# Patient Record
Sex: MALE | Race: WHITE
[De-identification: names, ages, dates, MRNs, and addresses within clinical notes are randomized per-mention and may not be internally consistent; named-entity substitution may affect disease eponyms.]

---

## 2017-03-12 ENCOUNTER — TRANSCRIPTION ENCOUNTER (OUTPATIENT)
Age: 42
End: 2017-03-12

## 2019-03-29 ENCOUNTER — APPOINTMENT (OUTPATIENT)
Dept: SURGERY | Facility: CLINIC | Age: 44
End: 2019-03-29
Payer: COMMERCIAL

## 2019-03-29 VITALS
OXYGEN SATURATION: 98 % | WEIGHT: 175 LBS | HEIGHT: 68 IN | BODY MASS INDEX: 26.52 KG/M2 | RESPIRATION RATE: 15 BRPM | HEART RATE: 91 BPM | SYSTOLIC BLOOD PRESSURE: 142 MMHG | DIASTOLIC BLOOD PRESSURE: 96 MMHG | TEMPERATURE: 97.9 F

## 2019-03-29 DIAGNOSIS — R10.32 LEFT LOWER QUADRANT PAIN: ICD-10-CM

## 2019-03-29 DIAGNOSIS — Z86.79 PERSONAL HISTORY OF OTHER DISEASES OF THE CIRCULATORY SYSTEM: ICD-10-CM

## 2019-03-29 DIAGNOSIS — Z78.9 OTHER SPECIFIED HEALTH STATUS: ICD-10-CM

## 2019-03-29 DIAGNOSIS — Z83.3 FAMILY HISTORY OF DIABETES MELLITUS: ICD-10-CM

## 2019-03-29 DIAGNOSIS — Z82.49 FAMILY HISTORY OF ISCHEMIC HEART DISEASE AND OTHER DISEASES OF THE CIRCULATORY SYSTEM: ICD-10-CM

## 2019-03-29 PROCEDURE — 99203 OFFICE O/P NEW LOW 30 MIN: CPT

## 2019-03-29 NOTE — HISTORY OF PRESENT ILLNESS
[de-identified] : Dane is a 44 y/o male here for evaluation of left groin pain.  [de-identified] : Over the past 2 months patient has been experiencing intermittent, mild left groin discomfort with heavy lifting. No bulge noted and no abdominal symptoms or change in bowel habits. Pain occasionally radiates to the left testicle. Past history significant for previous inguinal hernia repair during childhood although patient does not recall which side was repaired.

## 2019-03-29 NOTE — PHYSICAL EXAM
[Normal Thyroid] : the thyroid was normal [Respiratory Effort] : normal respiratory effort [Normal Rate and Rhythm] : normal rate and rhythm [Abdominal Aorta] : Normal abdominal aorta [Oriented to Person] : oriented to person [Oriented to Place] : oriented to place [Oriented to Time] : oriented to time [Calm] : calm [de-identified] : In no distress [de-identified] : Normocephalic, atraumatic [de-identified] : No palpable adenopathy [de-identified] : Soft, nondistended, nontender. No palpable mass or organomegaly. No surgical scar apparent in either groin. No hernia palpable in either groin. [de-identified] : External genitalia normal [de-identified] : Normal gait; no deformities [de-identified] : No gross sensory or motor deficit [de-identified] : Normal mood and affect

## 2019-03-29 NOTE — ASSESSMENT
[FreeTextEntry1] : 43-year-old male with intermittent, mild groin discomfort but negative physical exam. Symptoms seem likely related to muscle strain.

## 2019-03-29 NOTE — PLAN
[FreeTextEntry1] : Patient advised to carry on activities ad thad. and continue to observe. Patient to return for reevaluation if symptoms progress or a bulge becomes apparent.

## 2019-12-21 ENCOUNTER — TRANSCRIPTION ENCOUNTER (OUTPATIENT)
Age: 44
End: 2019-12-21

## 2020-10-18 ENCOUNTER — TRANSCRIPTION ENCOUNTER (OUTPATIENT)
Age: 45
End: 2020-10-18

## 2021-04-01 ENCOUNTER — TRANSCRIPTION ENCOUNTER (OUTPATIENT)
Age: 46
End: 2021-04-01

## 2021-07-30 ENCOUNTER — TRANSCRIPTION ENCOUNTER (OUTPATIENT)
Age: 46
End: 2021-07-30

## 2021-10-11 ENCOUNTER — TRANSCRIPTION ENCOUNTER (OUTPATIENT)
Age: 46
End: 2021-10-11

## 2022-03-11 ENCOUNTER — APPOINTMENT (OUTPATIENT)
Dept: OTOLARYNGOLOGY | Facility: CLINIC | Age: 47
End: 2022-03-11
Payer: COMMERCIAL

## 2022-03-11 ENCOUNTER — NON-APPOINTMENT (OUTPATIENT)
Age: 47
End: 2022-03-11

## 2022-03-11 VITALS
BODY MASS INDEX: 26.52 KG/M2 | HEART RATE: 84 BPM | SYSTOLIC BLOOD PRESSURE: 129 MMHG | HEIGHT: 68 IN | DIASTOLIC BLOOD PRESSURE: 89 MMHG | TEMPERATURE: 97.5 F | WEIGHT: 175 LBS

## 2022-03-11 PROCEDURE — 31575 DIAGNOSTIC LARYNGOSCOPY: CPT

## 2022-03-11 PROCEDURE — 99214 OFFICE O/P EST MOD 30 MIN: CPT | Mod: 25

## 2022-03-11 NOTE — CONSULT LETTER
[Consult Letter:] : I had the pleasure of evaluating your patient, [unfilled]. [Please see my note below.] : Please see my note below. [Consult Closing:] : Thank you very much for allowing me to participate in the care of this patient.  If you have any questions, please do not hesitate to contact me. [Sincerely,] : Sincerely, [Dear  ___] : Dear  [unfilled], [FreeTextEntry3] : Kylah Batres MD\par Otolaryngology and Cranial Base Surgery\par Attending Physician - Department of Otolaryngology and Head & Neck Surgery\par [facility]\par  - [employer] [facility]\par Office: [phone]\par Fax: [phone]\par

## 2022-03-11 NOTE — PROCEDURE
[de-identified] : Flexible scope #29 used. Passed through nasal passage and nasopharynx/oropharynx/hypopharynx clear. Supraglottis normal. Glottis with fully mobile vocal cords without lesions or masses. Visualized subglottis clear. Postcricoid area with small white ulcer/exudate to right arytenoid.  mild erythema and edema. no clearing of the white despite multiple repeated swallows. No pooling of secretions. PHOTOS taken\par \par

## 2022-03-11 NOTE — HISTORY OF PRESENT ILLNESS
[de-identified] : 47 y/o M with one month of intermittent sore throat, more on right.  Worse in the morning.  No throat clearing or change in voice.  No trouble eating or drinking.  No f/c/s.  Pos GERD, uses Tums on occasion.  \par Occasional Cigars.  No cigarettes.

## 2022-03-11 NOTE — ASSESSMENT
[FreeTextEntry1] : Small right Arytenoid ulceration, Likely due to LPRD:\par - Reflux precautions, handout given\par - Start Omeprazole in AM\par - Start Famotidine PM\par - F/U one month to ensure resolved. \par \par \par I personally saw and examined Mr. ALICIA OREILLY in detail this visit today. I personally reviewed the HPI, PMH, FH, SH, ROS and medications/allergies. I have spoken to JULIET Hernandez regarding the history and have personally determined the assessment and plan of care, and documented this myself. I was present and participated in all key portions of the encounter and all procedures noted above. I have made changes in the body of the note where appropriate.\par \par Attesting Faculty: See Attending Signature Below

## 2022-05-04 ENCOUNTER — APPOINTMENT (OUTPATIENT)
Dept: OTOLARYNGOLOGY | Facility: CLINIC | Age: 47
End: 2022-05-04
Payer: COMMERCIAL

## 2022-05-04 ENCOUNTER — TRANSCRIPTION ENCOUNTER (OUTPATIENT)
Age: 47
End: 2022-05-04

## 2022-05-04 VITALS — WEIGHT: 171 LBS | BODY MASS INDEX: 25.91 KG/M2 | HEIGHT: 68 IN

## 2022-05-04 DIAGNOSIS — K21.9 GASTRO-ESOPHAGEAL REFLUX DISEASE W/OUT ESOPHAGITIS: ICD-10-CM

## 2022-05-04 DIAGNOSIS — R07.0 PAIN IN THROAT: ICD-10-CM

## 2022-05-04 PROCEDURE — 31575 DIAGNOSTIC LARYNGOSCOPY: CPT

## 2022-05-04 PROCEDURE — 99214 OFFICE O/P EST MOD 30 MIN: CPT | Mod: 25

## 2022-05-04 NOTE — PROCEDURE
[de-identified] : Flexible scope #2 used. Passed through nasal passage and nasopharynx/oropharynx/hypopharynx clear. Supraglottis normal. Glottis with fully mobile vocal cords without lesions or masses. Resolved arytenoid ulceration but still with erythema. Visualized subglottis clear. Postcricoid area with erythema and edema. No pooling of secretions.

## 2022-05-04 NOTE — HISTORY OF PRESENT ILLNESS
[de-identified] : 47yo male seen for throat discomfort. He was noted to have LPRD and a right arytenoid ulceration. Started on 40mg omeprazole in AM and 20mg famotidine at night. He has also modified his diet and lost weight. He feels much better. No throat discomfort. However he does note he is currently trying to pass a kidney stone. He is using ibuprofen 600mg throughout the day to try and help the pain however overnight he had severe pain that was not well controlled. His urologist recommended go to ED for uncontrolled pain.

## 2022-05-04 NOTE — ASSESSMENT
[FreeTextEntry1] : arytenoid ulceration/LPRD, resolved with PPI/H2 blocker and lifestyle modification:\par - recommend cont current regimen while he is taking NSAIDs for his kidney stone\par - also sent in oxycodone 5mg 20 tablets to help him with severe pain so he can reduce his use of the ibuprofen (iSTOP checked and no narcotic prescriptions in the past 12 months)\par - once he is off the NSAIDs advised he can taper off the omeprazole and switch to fomatodine 20mg BID x 2 weeks then just in PM x 2 weeks then can d/c\par - if symptoms recur would resume and return for re-eval

## 2022-05-04 NOTE — REASON FOR VISIT
[Subsequent Evaluation] : a subsequent evaluation for [FreeTextEntry2] : right arytenoid ulceration/LPRD

## 2022-05-13 ENCOUNTER — NON-APPOINTMENT (OUTPATIENT)
Age: 47
End: 2022-05-13

## 2022-11-29 ENCOUNTER — NON-APPOINTMENT (OUTPATIENT)
Age: 47
End: 2022-11-29

## 2023-01-01 ENCOUNTER — NON-APPOINTMENT (OUTPATIENT)
Age: 48
End: 2023-01-01

## 2023-01-02 ENCOUNTER — EMERGENCY (EMERGENCY)
Facility: HOSPITAL | Age: 48
LOS: 1 days | Discharge: HOME | End: 2023-01-02
Attending: EMERGENCY MEDICINE
Payer: COMMERCIAL

## 2023-01-02 VITALS — OXYGEN SATURATION: 98 % | TEMPERATURE: 98 F | RESPIRATION RATE: 18 BRPM

## 2023-01-02 VITALS
DIASTOLIC BLOOD PRESSURE: 103 MMHG | OXYGEN SATURATION: 98 % | WEIGHT: 175.05 LBS | RESPIRATION RATE: 18 BRPM | HEART RATE: 92 BPM | TEMPERATURE: 98 F | HEIGHT: 68 IN | SYSTOLIC BLOOD PRESSURE: 149 MMHG

## 2023-01-02 LAB
ALBUMIN SERPL ELPH-MCNC: 4.8 G/DL — SIGNIFICANT CHANGE UP (ref 3.3–5)
ALP SERPL-CCNC: 67 U/L — SIGNIFICANT CHANGE UP (ref 40–120)
ALT FLD-CCNC: 50 U/L — HIGH (ref 10–45)
ANION GAP SERPL CALC-SCNC: 11 MMOL/L — SIGNIFICANT CHANGE UP (ref 5–17)
AST SERPL-CCNC: 53 U/L — HIGH (ref 10–40)
BASOPHILS # BLD AUTO: 0.05 K/UL — SIGNIFICANT CHANGE UP (ref 0–0.2)
BASOPHILS NFR BLD AUTO: 0.7 % — SIGNIFICANT CHANGE UP (ref 0–2)
BILIRUB SERPL-MCNC: 0.5 MG/DL — SIGNIFICANT CHANGE UP (ref 0.2–1.2)
BUN SERPL-MCNC: 9 MG/DL — SIGNIFICANT CHANGE UP (ref 7–23)
CALCIUM SERPL-MCNC: 9.2 MG/DL — SIGNIFICANT CHANGE UP (ref 8.4–10.5)
CHLORIDE SERPL-SCNC: 100 MMOL/L — SIGNIFICANT CHANGE UP (ref 96–108)
CO2 SERPL-SCNC: 25 MMOL/L — SIGNIFICANT CHANGE UP (ref 22–31)
CREAT SERPL-MCNC: 0.79 MG/DL — SIGNIFICANT CHANGE UP (ref 0.5–1.3)
EGFR: 112 ML/MIN/1.73M2 — SIGNIFICANT CHANGE UP
EOSINOPHIL # BLD AUTO: 0.16 K/UL — SIGNIFICANT CHANGE UP (ref 0–0.5)
EOSINOPHIL NFR BLD AUTO: 2.2 % — SIGNIFICANT CHANGE UP (ref 0–6)
GLUCOSE SERPL-MCNC: 122 MG/DL — HIGH (ref 70–99)
HCT VFR BLD CALC: 45.9 % — SIGNIFICANT CHANGE UP (ref 39–50)
HGB BLD-MCNC: 15.7 G/DL — SIGNIFICANT CHANGE UP (ref 13–17)
IMM GRANULOCYTES NFR BLD AUTO: 0.8 % — SIGNIFICANT CHANGE UP (ref 0–0.9)
LYMPHOCYTES # BLD AUTO: 1.76 K/UL — SIGNIFICANT CHANGE UP (ref 1–3.3)
LYMPHOCYTES # BLD AUTO: 24.6 % — SIGNIFICANT CHANGE UP (ref 13–44)
MCHC RBC-ENTMCNC: 30.1 PG — SIGNIFICANT CHANGE UP (ref 27–34)
MCHC RBC-ENTMCNC: 34.2 GM/DL — SIGNIFICANT CHANGE UP (ref 32–36)
MCV RBC AUTO: 87.9 FL — SIGNIFICANT CHANGE UP (ref 80–100)
MONOCYTES # BLD AUTO: 0.61 K/UL — SIGNIFICANT CHANGE UP (ref 0–0.9)
MONOCYTES NFR BLD AUTO: 8.5 % — SIGNIFICANT CHANGE UP (ref 2–14)
NEUTROPHILS # BLD AUTO: 4.51 K/UL — SIGNIFICANT CHANGE UP (ref 1.8–7.4)
NEUTROPHILS NFR BLD AUTO: 63.2 % — SIGNIFICANT CHANGE UP (ref 43–77)
NRBC # BLD: 0 /100 WBCS — SIGNIFICANT CHANGE UP (ref 0–0)
PLATELET # BLD AUTO: 225 K/UL — SIGNIFICANT CHANGE UP (ref 150–400)
POTASSIUM SERPL-MCNC: 4.7 MMOL/L — SIGNIFICANT CHANGE UP (ref 3.5–5.3)
POTASSIUM SERPL-SCNC: 4.7 MMOL/L — SIGNIFICANT CHANGE UP (ref 3.5–5.3)
PROT SERPL-MCNC: 8 G/DL — SIGNIFICANT CHANGE UP (ref 6–8.3)
RBC # BLD: 5.22 M/UL — SIGNIFICANT CHANGE UP (ref 4.2–5.8)
RBC # FLD: 12.1 % — SIGNIFICANT CHANGE UP (ref 10.3–14.5)
SODIUM SERPL-SCNC: 136 MMOL/L — SIGNIFICANT CHANGE UP (ref 135–145)
WBC # BLD: 7.15 K/UL — SIGNIFICANT CHANGE UP (ref 3.8–10.5)
WBC # FLD AUTO: 7.15 K/UL — SIGNIFICANT CHANGE UP (ref 3.8–10.5)

## 2023-01-02 PROCEDURE — 80053 COMPREHEN METABOLIC PANEL: CPT

## 2023-01-02 PROCEDURE — 99285 EMERGENCY DEPT VISIT HI MDM: CPT

## 2023-01-02 PROCEDURE — 99285 EMERGENCY DEPT VISIT HI MDM: CPT | Mod: 25

## 2023-01-02 PROCEDURE — 70498 CT ANGIOGRAPHY NECK: CPT | Mod: MA

## 2023-01-02 PROCEDURE — 85025 COMPLETE CBC W/AUTO DIFF WBC: CPT

## 2023-01-02 PROCEDURE — 70496 CT ANGIOGRAPHY HEAD: CPT | Mod: 26,MA

## 2023-01-02 PROCEDURE — 70496 CT ANGIOGRAPHY HEAD: CPT | Mod: MA

## 2023-01-02 PROCEDURE — 36415 COLL VENOUS BLD VENIPUNCTURE: CPT

## 2023-01-02 PROCEDURE — 70498 CT ANGIOGRAPHY NECK: CPT | Mod: 26,MA

## 2023-01-02 PROCEDURE — 93005 ELECTROCARDIOGRAM TRACING: CPT

## 2023-01-02 RX ORDER — MECLIZINE HCL 12.5 MG
25 TABLET ORAL ONCE
Refills: 0 | Status: COMPLETED | OUTPATIENT
Start: 2023-01-02 | End: 2023-01-02

## 2023-01-02 RX ORDER — SODIUM CHLORIDE 9 MG/ML
1000 INJECTION INTRAMUSCULAR; INTRAVENOUS; SUBCUTANEOUS ONCE
Refills: 0 | Status: COMPLETED | OUTPATIENT
Start: 2023-01-02 | End: 2023-01-02

## 2023-01-02 RX ORDER — MECLIZINE HCL 12.5 MG
1 TABLET ORAL
Qty: 21 | Refills: 0
Start: 2023-01-02 | End: 2023-01-08

## 2023-01-02 RX ORDER — DIAZEPAM 5 MG
1 TABLET ORAL
Qty: 7 | Refills: 0
Start: 2023-01-02 | End: 2023-01-08

## 2023-01-02 RX ORDER — DIAZEPAM 5 MG
1 TABLET ORAL ONCE
Refills: 0 | Status: DISCONTINUED | OUTPATIENT
Start: 2023-01-02 | End: 2023-01-02

## 2023-01-02 RX ADMIN — Medication 1 MILLIGRAM(S): at 20:31

## 2023-01-02 RX ADMIN — SODIUM CHLORIDE 1000 MILLILITER(S): 9 INJECTION INTRAMUSCULAR; INTRAVENOUS; SUBCUTANEOUS at 13:46

## 2023-01-02 RX ADMIN — Medication 25 MILLIGRAM(S): at 13:46

## 2023-01-02 RX ADMIN — Medication 25 MILLIGRAM(S): at 12:27

## 2023-01-02 NOTE — ED PROVIDER NOTE - ATTENDING APP SHARED VISIT CONTRIBUTION OF CARE
Dr. Brooke: I performed a face to face bedside interview with patient regarding history of present illness, review of symptoms and past medical history. I completed an independent physical exam.  I have discussed patient's plan of care with PA.   I agree with note as stated above, having amended the EMR as needed to reflect my findings.   This includes HISTORY OF PRESENT ILLNESS, HIV, PAST MEDICAL/SURGICAL/FAMILY/SOCIAL HISTORY, ALLERGIES AND HOME MEDICATIONS, REVIEW OF SYSTEMS, PHYSICAL EXAM, and any PROGRESS NOTES during the time I functioned as the attending physician for this patient.    Dr. Brooke: This H&P has been written by myself in its entirety

## 2023-01-02 NOTE — ED PROVIDER NOTE - RAPID ASSESSMENT
46yo M Hx of HTN, compliant with medications. with 2 days vertigo, feeling unbalanced. covid in early december. ski accident one week ago, no AC, no LOC. seen at urgent care and referred to the ED.     I, Dr. Lauren, saw patient as a rapid assessment initially, rest of care performed by another attending

## 2023-01-02 NOTE — ED PROVIDER NOTE - NSFOLLOWUPINSTRUCTIONS_ED_ALL_ED_FT
YOU WERE SEEN IN THE ED FOR: dizziness    WHILE YOU WERE HERE, YOU HAD: labs, CT angio of your head and neck.  You received Meclizine and Valium (medications) both of which you are also being prescribed at the recommendation of neurology.  You were also evaluated by the neurology team and their recommendations are included below.    YOU WERE PRESCRIBED: Valium and Meclizine  FOLLOW THE INSTRUCTIONS ON THE LABEL/CONTAINER    Neurology recommended you follow up with Dr. Osvaldo Robin [address] [facility], [place] [address] and follow up with outpatient [facility].    PLEASE CALL TO SET UP FOLLOW UP IN THE NEXT 48 HOURS. BRING COPIES OF YOUR RESULTS.    RETURN TO THE EMERGENCY DEPARTMENT IF YOU EXPERIENCE ANY NEW/CONCERNING/WORSENING SYMPTOMS SUCH AS BUT NOT LIMITED TO: change in vision, double vision, sudden loss of vision, loss of urinary or bowel continence, numbness, weakness or tingling in extremities, loss of consciousness or any other concerns. YOU WERE SEEN IN THE ED FOR: dizziness    WHILE YOU WERE HERE, YOU HAD: labs, CT angio of your head and neck.  You received Meclizine and Valium (medications) both of which you are also being prescribed at the recommendation of neurology.  You were also evaluated by the neurology team and their recommendations are included below.    YOU WERE PRESCRIBED: Valium and Meclizine  FOLLOW THE INSTRUCTIONS ON THE LABEL/CONTAINER    Neurology recommended you follow up with Dr. Osvaldo Robin [address] [facility], [place] [address] and follow up with outpatient [facility].    PLEASE CALL TO SET UP FOLLOW UP IN THE NEXT 48 HOURS. BRING COPIES OF YOUR RESULTS.    RETURN TO THE EMERGENCY DEPARTMENT IF YOU EXPERIENCE ANY NEW/CONCERNING/WORSENING SYMPTOMS SUCH AS BUT NOT LIMITED TO: change in vision, double vision, sudden loss of vision, loss of urinary or bowel continence, numbness, weakness or tingling in extremities, loss of consciousness or any other concerns.    Osvaldo Robin)  Neurology; Neurophysiology  [address] [facility], [address]  [place] [address]  Phone: [phone]  Fax: [phone]  Follow Up Time: Urgent

## 2023-01-02 NOTE — CONSULT NOTE ADULT - SUBJECTIVE AND OBJECTIVE BOX
Neurology - Consult Note    -  Spectra: [phone] ([facility]), [phone] ([facility])  -    HPI: Patient ALICIA OREILLY is a 47y (1975) man with HTN, Hx Nephrolithiasis presenting with vertigo for 2 days. Patient reports he started to have room spinning dizziness wosrsening with head movements, which then imporved. Now he describs light headedness and dizziness when standing up and walking around. If he sits still and doesn't move he feels fine. He had COVID 2 weeks ago and experienced fever, chills and fatigue. He also fell while skiing 1 week ago. He reports not hitting his head that hard and felt fine afterwards. Denies previous history of vertigo, weakness, numbness, changes to speech, vision, headache, nausea, ear pain, tinnitis.       Review of Systems:  All other review of systems is negative unless indicated above.    Allergies:      PMHx/PSHx/Family Hx: As above, otherwise see below   Mild HTN    HTN (hypertension)        Social Hx:  No current use of tobacco, alcohol, or illicit drugs      Medications:  MEDICATIONS  (STANDING):    MEDICATIONS  (PRN):      Vitals:  T(C): 36.6 (01-02-23 @ 11:27), Max: 36.6 (01-02-23 @ 11:27)  HR: 89 (01-02-23 @ 18:26) (85 - 92)  BP: 139/89 (01-02-23 @ 18:26) (139/89 - 162/109)  RR: 17 (01-02-23 @ 18:26) (17 - 18)  SpO2: 99% (01-02-23 @ 18:26) (98% - 99%)    Physical Examination:   General - NAD  Cardiovascular - Peripheral pulses palpable, no edema  Eyes - Fundoscopy not performed due to safety precautions in the setting of the COVID-19 pandemic  HINT: no nystagmus, no correctional saccades, no skew deviation     Neurologic Exam:  Mental status - Awake, Alert, Oriented to person, place, and time. Speech fluent, repetition and naming intact. Follows simple and complex commands.     Cranial nerves - PERRL, VFF, EOMI, face sensation (V1-V3) intact b/l, facial strength intact without asymmetry b/l, hearing intact b/l, palate with symmetric elevation, trapezius  5/5 strength b/l, tongue midline on protrusion with full lateral movement    Motor - Normal bulk and tone throughout. No pronator drift.    Strength testing            Deltoid      Biceps      Triceps     Wrist Extension             Wrist Flexion     Interossei         R            5                 5               5                     5                              5                        5                 5  L             5                 5               5                     5                              5                        5                 5              Hip Flexion    Hip Extension    Knee Flexion       Knee Extension    Dorsiflexion    Plantar Flexion  R              5                           5                       5                           5                            5                          5  L              5                           5                        5                           5                            5                          5    Sensation - Light touch/temperature intact throughout    DTR's -             Biceps      Triceps     Brachioradialis      Patellar    Ankle    Toes/plantar response  R             2+             2+                  2+                       2+            2+                 Down  L              2+             2+                 2+                        2+           2+                 Down    Coordination - Finger to Nose intact and heel to shin b/l. No tremors appreciated    Gait and station - Normal casual gait. Romberg (-)    Labs:                        15.7   7.15  )-----------( 225      ( 02 Jan 2023 12:55 )             45.9     01-02    136  |  100  |  9   ----------------------------<  122<H>  4.7   |  25  |  0.79    Ca    9.2      02 Jan 2023 12:55    TPro  8.0  /  Alb  4.8  /  TBili  0.5  /  DBili  x   /  AST  53<H>  /  ALT  50<H>  /  AlkPhos  67  01-02    CAPILLARY BLOOD GLUCOSE        LIVER FUNCTIONS - ( 02 Jan 2023 12:55 )  Alb: 4.8 g/dL / Pro: 8.0 g/dL / ALK PHOS: 67 U/L / ALT: 50 U/L / AST: 53 U/L / GGT: x               Radiology:  < from: CT Angio Neck w/ IV Cont (01.02.23 @ 17:17) >  Brain CTA:    The distal internal carotid arteries are patent.  The [place] of [place] is normal in appearance.  The visualized cerebral arteries are unremarkable.  The vertebrobasilar junction and basilar artery are normal.    All measurements are performed using standard NASCET criteria.    CT of the head demonstrates the ventricles and sulci to be normal in   appearance. No intracranial mass effect or blood is seen. No areas of   abnormal attenuation or abnormal enhancement are seen.         Neurology - Consult Note    -  Spectra: [phone] ([facility]), [phone] ([facility])  -    HPI: Patient ALICIA OREILLY is a 47y (1975) man with HTN and Hx Nephrolithiasis presenting with vertigo for 2 days. Patient reports he started to have room spinning dizziness provoked with head movements. Dizziness has improved overall, but still has not resolved completely and feels unsteady when he walks. He also endorses light headedness upon standing as well. If he sits still and doesn't move then dizziness resolves. He had COVID 2 weeks ago and experienced fever, chills and fatigue. He also fell while skiing 1 week ago. He reports hitting his head, but not that hard and felt fine afterwards. Denies previous history of vertigo, weakness, numbness, changes to speech, vision, headache, nausea, ear pain, tinnitis, falls.       Review of Systems:  All other review of systems is negative unless indicated above.    Allergies:      PMHx/PSHx/Family Hx: As above, otherwise see below   Mild HTN    HTN (hypertension)        Social Hx:  No current use of tobacco, alcohol, or illicit drugs      Medications:  MEDICATIONS  (STANDING):    MEDICATIONS  (PRN):      Vitals:  T(C): 36.6 (01-02-23 @ 11:27), Max: 36.6 (01-02-23 @ 11:27)  HR: 89 (01-02-23 @ 18:26) (85 - 92)  BP: 139/89 (01-02-23 @ 18:26) (139/89 - 162/109)  RR: 17 (01-02-23 @ 18:26) (17 - 18)  SpO2: 99% (01-02-23 @ 18:26) (98% - 99%)    Physical Examination:   General - NAD  Cardiovascular - Peripheral pulses palpable, no edema  Eyes - Fundoscopy not performed due to safety precautions in the setting of the COVID-19 pandemic  HINT: no nystagmus, no correctional saccades, no skew deviation     Neurologic Exam:  Mental status - Awake, Alert, Oriented to person, place, and time. Speech fluent, repetition and naming intact. Follows simple and complex commands.     Cranial nerves - PERRL, VFF, EOMI, face sensation (V1-V3) intact b/l, facial strength intact without asymmetry b/l, hearing intact b/l, palate with symmetric elevation, trapezius  5/5 strength b/l, tongue midline on protrusion with full lateral movement    Motor - Normal bulk and tone throughout. No pronator drift.    Strength testing            Deltoid      Biceps      Triceps     Wrist Extension             Wrist Flexion     Interossei         R            5                 5               5                     5                              5                        5                 5  L             5                 5               5                     5                              5                        5                 5              Hip Flexion    Hip Extension    Knee Flexion       Knee Extension    Dorsiflexion    Plantar Flexion  R              5                           5                       5                           5                            5                          5  L              5                           5                        5                           5                            5                          5    Sensation - Light touch/temperature intact throughout    DTR's -             Biceps      Triceps     Brachioradialis      Patellar    Ankle    Toes/plantar response  R             2+             2+                  2+                       2+            2+                 Down  L              2+             2+                 2+                        2+           2+                 Down    Coordination - Finger to Nose intact and heel to shin b/l. No tremors appreciated    Gait and station - Normal casual gait. Romberg (-)    Labs:                        15.7   7.15  )-----------( 225      ( 02 Jan 2023 12:55 )             45.9     01-02    136  |  100  |  9   ----------------------------<  122<H>  4.7   |  25  |  0.79    Ca    9.2      02 Jan 2023 12:55    TPro  8.0  /  Alb  4.8  /  TBili  0.5  /  DBili  x   /  AST  53<H>  /  ALT  50<H>  /  AlkPhos  67  01-02    CAPILLARY BLOOD GLUCOSE        LIVER FUNCTIONS - ( 02 Jan 2023 12:55 )  Alb: 4.8 g/dL / Pro: 8.0 g/dL / ALK PHOS: 67 U/L / ALT: 50 U/L / AST: 53 U/L / GGT: x               Radiology:  < from: CT Angio Neck w/ IV Cont (01.02.23 @ 17:17) >  Brain CTA:    The distal internal carotid arteries are patent.  The [place] of [place] is normal in appearance.  The visualized cerebral arteries are unremarkable.  The vertebrobasilar junction and basilar artery are normal.    All measurements are performed using standard NASCET criteria.    CT of the head demonstrates the ventricles and sulci to be normal in   appearance. No intracranial mass effect or blood is seen. No areas of   abnormal attenuation or abnormal enhancement are seen.

## 2023-01-02 NOTE — ED PROVIDER NOTE - PATIENT PORTAL LINK FT
You can access the FollowMyHealth Patient Portal offered by [facility] by registering at the following website: [email or web address]. By joining [employer]’s FollowMyHealth portal, you will also be able to view your health information using other applications (apps) compatible with our system.

## 2023-01-02 NOTE — ED PROVIDER NOTE - OBJECTIVE STATEMENT
Dr. Brooke: 47-year-old male history of hypertension, borderline diabetes, family history of CVA (father had a CVA in his 50s and  at age 61) presenting with 2 days of vertigo.  Patient states 2 days ago when he woke up feeling dizzy.  Symptoms improved on their own but still persistent so he came in. States the meclizine given in triage did not help. No fall. No ringing in ears, no ear pain. Dr. Brooke: 47-year-old male history of hypertension, borderline diabetes, family history of CVA (father had a CVA in his 50s and  at age 61) presenting with 2 days of vertigo.  Patient states 2 days ago when he woke up feeling dizzy.  Symptoms improved on their own but still persistent so he came in. States the meclizine given in triage did not help. No fall. No ringing in ears, no ear pain.    devonte santos PA: 46 y/o male, pmh HTN, family hx of CVA, presents to the ED for dizziness x two days. states dizziness started two days ago. states experiences dizziness when he turns his head. states at times dizziness is generalized and present when he walks leaving him unsteady. denies CP, SOB, f/n/v/d, HA, ear pain. patient AOx3. ears examined b/l. no erythema, no bulging TM, good cone of light. no cerumen impaction.

## 2023-01-02 NOTE — ED ADULT NURSE REASSESSMENT NOTE - NS ED NURSE REASSESS COMMENT FT1
Pt reassessed with complaints of dizziness.  Pt states that meds that he doesn't feel any better, MD aware. Pt is a/ox4 and verbal. Speech is clear and able to speak in full sentences without distress. Airway is patent with no obstruction nor blocking secretions. Breathing is even and unlabored on room air. Pt has strong pulses palpated bilaterally. Neuro check is wnl. Full rom. Pt denies chest pain, n/v and sob. No acute distress noted. Pt has call light within the reach of the pt at the bedside. Will continue to monitor the pt.

## 2023-01-02 NOTE — ED PROVIDER NOTE - SHIFT CHANGE DETAILS
f/u CTA, reassess f/u CTA, reassess    Attending MD Greene: 47M w HTN, preDM, FHx CVA 50s, vertigo x2d, not improved w Meclizine, pending CTA HN read

## 2023-01-02 NOTE — ED PROVIDER NOTE - CARE PROVIDER_API CALL
Osvaldo Robin)  Neurology; Neurophysiology  [address] [facility] [address]  [place] [address]  Phone: [phone]  Fax: [phone]  Follow Up Time: Urgent

## 2023-01-02 NOTE — ED ADULT TRIAGE NOTE - CHIEF COMPLAINT QUOTE
12/18 positive COVID, ski accident 1 week ago with head trauma, wearing helmet. intermittent vertigo symptoms x 2 days. history of HTN, compliant with meds denies changes.

## 2023-01-02 NOTE — ED ADULT NURSE NOTE - NSIMPLEMENTINTERV_GEN_ALL_ED
Implemented All Universal Safety Interventions:  [place] to call system. Call bell, personal items and telephone within reach. Instruct patient to call for assistance. Room bathroom lighting operational. Non-slip footwear when patient is off stretcher. Physically safe environment: no spills, clutter or unnecessary equipment. Stretcher in lowest position, wheels locked, appropriate side rails in place.

## 2023-01-02 NOTE — ED PROVIDER NOTE - DIFFERENTIAL DIAGNOSIS
Pt p/w vertigo, peripheral vs central, given personal and family's risk factors will get CT/CTA r/o posterior circ CVA Differential Diagnosis

## 2023-01-02 NOTE — ED ADULT NURSE NOTE - OBJECTIVE STATEMENT
First encounter with the pt, pt received from triage with complaints of ongoing dizziness. Pt had recent ski accident with no positive LOC and Covid in 12/22. Pt states that he has seen PCP and recent urgent care visit with the dizziness note getting better. Pt states that he does have hx of HTN and taking meds. Last dose was this am.  Pt is a/ox4 and verbal. Speech is clear and able to speak in full sentences without distress. Airway is patent with no obstruction nor blocking secretions. Breathing is even and unlabored on room air. Pt has strong pulses palpated bilaterally. Neuro check is wnl. Full rom. Pt denies chest pain, n/v and sob. No acute distress noted. Pt has call light within the reach of the pt at the bedside. Will continue to monitor the pt.

## 2023-01-02 NOTE — ED PROVIDER NOTE - CLINICAL SUMMARY MEDICAL DECISION MAKING FREE TEXT BOX
Pt p/w vertigo, peripheral vs central, given personal and family's risk factors will get CT/CTA r/o posterior circ CVA

## 2023-01-02 NOTE — ED PROVIDER NOTE - PROGRESS NOTE DETAILS
Kirk CHUNG: CTA reviewed with patient. states still feels dizzy and unsteady. neurology paged. pending consult at this time. Attending MD Greene: CTA nonactionable, patient persistently dizzy, pending neurology Attending MD Greene: Patient reports spoke with neuro via phone, amenable to outpatient follow up.  Reviewed labs and imaging with patient and wife, discussed mild elevation in AST/ALT will follow up with PMD.  Will also follow up with Neuro.  Follow up instructions given, importance of follow up emphasized, return to ED parameters reviewed and patient verbalized understanding.  All questions answered, all concerns addressed.

## 2023-01-07 ENCOUNTER — NON-APPOINTMENT (OUTPATIENT)
Age: 48
End: 2023-01-07

## 2023-01-12 ENCOUNTER — APPOINTMENT (OUTPATIENT)
Dept: OTOLARYNGOLOGY | Facility: CLINIC | Age: 48
End: 2023-01-12
Payer: COMMERCIAL

## 2023-01-12 VITALS
TEMPERATURE: 97.3 F | HEART RATE: 98 BPM | WEIGHT: 175 LBS | HEIGHT: 68 IN | BODY MASS INDEX: 26.52 KG/M2 | DIASTOLIC BLOOD PRESSURE: 105 MMHG | SYSTOLIC BLOOD PRESSURE: 148 MMHG

## 2023-01-12 DIAGNOSIS — H90.3 SENSORINEURAL HEARING LOSS, BILATERAL: ICD-10-CM

## 2023-01-12 PROCEDURE — 99214 OFFICE O/P EST MOD 30 MIN: CPT | Mod: 25

## 2023-01-12 PROCEDURE — 92557 COMPREHENSIVE HEARING TEST: CPT

## 2023-01-12 PROCEDURE — 92567 TYMPANOMETRY: CPT

## 2023-01-12 PROCEDURE — 95992 CANALITH REPOSITIONING PROC: CPT | Mod: RT

## 2023-01-12 NOTE — HISTORY OF PRESENT ILLNESS
[de-identified] : 48 yo male\par PAtient states he had a skiing accident, he hit his head did not have a concussion. 6 days later he started getting dizzy spells. Worse when he turns to the R, last several seconds then he feels okay. He was evaluated in the ED,  had several testing done which came back wnl. He was evaluated by Neurology, was told that he has BPPV gave him a script for vestibular rehab. [Tinnitus] : no tinnitus [Dizziness] : dizziness [Hearing Loss] : no hearing loss [Eustachian Tube Dysfunction] : no eustachian tube dysfunction [Cholesteatoma] : no cholesteatoma [Nasal Congestion] : no nasal congestion [None] : No associated symptoms are reported.

## 2023-01-12 NOTE — PROCEDURE
[Risk and Benefits Discussed] : The purpose, risks, discomforts, benefits and alternatives of the procedure have been explained to the patient including no treatment. [Same] : same as the Pre Op Dx. [] : Epley Maneuver [FreeTextEntry6] : classic responses-nystagmus

## 2023-01-12 NOTE — ASSESSMENT
[FreeTextEntry1] : Mr. OREILLY 47 year M complains of vertigo when turning to the R happened after a skiing accident last week.\par \par R BPPV:\par -Hearing Test performed to evaluate the extent of hearing loss and to explain pt's symptoms-bilat HF SNHL\par avoid loud noises\par \par Rt BPPV\par tx w/Epley\par may need another tx\par f/u next week\par \par \par f/u prn 
No

## 2023-01-12 NOTE — DATA REVIEWED
[de-identified] : Hearing Test performed to evaluate the extent of hearing loss and  to explain pt's symptoms\par today's hearing test was personally reviewed and revealed\par RT: WNL and LT; mild SNHL at 4kHz

## 2023-01-12 NOTE — PHYSICAL EXAM
[Hearing Loss Right Only] : normal [Hearing Loss Left Only] : normal [Nystagmus] : ~T no ~M nystagmus was seen [Fukuda Step Test] : Fukuda Step Test was Negative [Sharmila-Zorake] : [place]-Hallpike: Positive [de-identified] : poss Right [place] Hallpike [Midline] : trachea located in midline position [Normal] : no rashes

## 2023-01-12 NOTE — END OF VISIT
[FreeTextEntry3] : I personally saw and examined ALICIA OREILLY in detail. I spoke to JULIET Morillo regarding the assessment and plan of care. I reviewed the above assessment and plan of care, and agree. I have made changes in changes in the body of the note where appropriate.I personally reviewed the HPI, PMH, FH, SH, ROS and medications/allergies. I have spoken to JULIET Morillo regarding the history and have personally determined the assessment and plan of care, and documented this myself. I was present and participated in all key portions of the encounter and all procedures noted above. I have made changes in the body of the note where appropriate.\par \par Attesting Faculty: See Attending Signature Below \par \par \par

## 2023-01-13 ENCOUNTER — APPOINTMENT (OUTPATIENT)
Dept: OTOLARYNGOLOGY | Facility: CLINIC | Age: 48
End: 2023-01-13
Payer: COMMERCIAL

## 2023-01-13 VITALS
DIASTOLIC BLOOD PRESSURE: 99 MMHG | BODY MASS INDEX: 26.52 KG/M2 | SYSTOLIC BLOOD PRESSURE: 140 MMHG | WEIGHT: 175 LBS | HEIGHT: 68 IN | TEMPERATURE: 97.5 F | HEART RATE: 102 BPM

## 2023-01-13 DIAGNOSIS — H81.11 BENIGN PAROXYSMAL VERTIGO, RIGHT EAR: ICD-10-CM

## 2023-01-13 DIAGNOSIS — R42 DIZZINESS AND GIDDINESS: ICD-10-CM

## 2023-01-13 PROCEDURE — 99214 OFFICE O/P EST MOD 30 MIN: CPT | Mod: 25

## 2023-01-13 PROCEDURE — 95992 CANALITH REPOSITIONING PROC: CPT | Mod: RT

## 2023-01-13 NOTE — PHYSICAL EXAM
[Sharmila-Zorake] : [place]-Hallpike: Positive [Midline] : trachea located in midline position [Normal] : no rashes [Hearing Loss Right Only] : normal [Hearing Loss Left Only] : normal [Nystagmus] : ~T no ~M nystagmus was seen [Fistula Sign] : Fistula Sign: Negative [de-identified] : pos Hallpike-right

## 2023-01-13 NOTE — ASSESSMENT
[FreeTextEntry1] : Mr. OREILLY 47 year M presents s/p Epley Maneuver, states he had no improvement with it. Worse when turning to the Right \par \par Right BPPV r/o bilat BPPV vs cervical vertigo\par -Epley done again today in office \par -If no improvement advised that he start with vestibular rehab\par Vestib testing by neuro scheduled for next week\par \par \par f/u prn

## 2023-01-13 NOTE — HISTORY OF PRESENT ILLNESS
[Vertigo] : vertigo [Dizziness] : dizziness [None] : No associated symptoms are reported. [de-identified] : 46 yo male\par PAtient presents s/p Epley Maneuver, states he sees no improvement with the Epley Maneuver. Continues to get dizzy spells worse when looking to the right. Felt worse after Epley tx 2 days ago.\par This all began after a fall-skiing w/ assoc head  trauma. See prev note [Hearing Loss] : no hearing loss [Nasal Congestion] : no nasal congestion [Allergic Rhinitis] : no allergic rhinitis [Adenoidectomy] : no adenoidectomy [Allergies] : no allergies [Asthma] : no asthma

## 2023-01-13 NOTE — PROCEDURE
[Risk and Benefits Discussed] : The purpose, risks, discomforts, benefits and alternatives of the procedure have been explained to the patient including no treatment. [Same] : same as the Pre Op Dx. [] : Epley Maneuver [FreeTextEntry6] : dizzy in right and left ear down position w/ assoc nystagmus

## 2023-01-17 ENCOUNTER — NON-APPOINTMENT (OUTPATIENT)
Age: 48
End: 2023-01-17

## 2023-08-16 ENCOUNTER — APPOINTMENT (OUTPATIENT)
Dept: INTERNAL MEDICINE | Facility: CLINIC | Age: 48
End: 2023-08-16
Payer: COMMERCIAL

## 2023-08-16 VITALS
DIASTOLIC BLOOD PRESSURE: 82 MMHG | OXYGEN SATURATION: 97 % | BODY MASS INDEX: 25.61 KG/M2 | HEIGHT: 68 IN | TEMPERATURE: 97.3 F | SYSTOLIC BLOOD PRESSURE: 124 MMHG | HEART RATE: 76 BPM | WEIGHT: 169 LBS

## 2023-08-16 DIAGNOSIS — Z00.00 ENCOUNTER FOR GENERAL ADULT MEDICAL EXAMINATION W/OUT ABNORMAL FINDINGS: ICD-10-CM

## 2023-08-16 DIAGNOSIS — Z87.442 PERSONAL HISTORY OF URINARY CALCULI: ICD-10-CM

## 2023-08-16 PROCEDURE — 99386 PREV VISIT NEW AGE 40-64: CPT

## 2023-08-16 RX ORDER — OMEPRAZOLE 40 MG/1
40 CAPSULE, DELAYED RELEASE ORAL
Qty: 1 | Refills: 1 | Status: DISCONTINUED | COMMUNITY
Start: 2022-03-11 | End: 2023-08-16

## 2023-08-16 RX ORDER — FAMOTIDINE 20 MG/1
20 TABLET, FILM COATED ORAL TWICE DAILY
Qty: 60 | Refills: 0 | Status: DISCONTINUED | COMMUNITY
Start: 2022-03-11 | End: 2023-08-16

## 2023-08-16 RX ORDER — OXYCODONE HYDROCHLORIDE 5 MG/1
5 CAPSULE ORAL
Qty: 20 | Refills: 0 | Status: DISCONTINUED | COMMUNITY
Start: 2022-05-04 | End: 2023-08-16

## 2023-08-16 NOTE — HISTORY OF PRESENT ILLNESS
[FreeTextEntry1] : Annual Physical [de-identified] : ALICIA OREILLY is a 46 yo man with hypertension, hypercholesterolemia, pre diabetes, kidney stones here for a physical.  He has been well overall. Brought in labs for review today. One liver test is borderline.  Had calcium scoring, 22 then started on statin with improvement.    Colonoscopy utd 2023.  Derm due.    The patient is  with 3 children. He works in [employer].  He exercises regularly without difficulty.

## 2023-08-16 NOTE — ASSESSMENT
[FreeTextEntry1] : HCM Labs due 10/23 Colonoscopy utd 2023 Derm due Urology due continue current meds Bp check in 6 months

## 2023-08-16 NOTE — PHYSICAL EXAM
[No Acute Distress] : no acute distress [Well Nourished] : well nourished [Well Developed] : well developed [Well-Appearing] : well-appearing [Normal Sclera/Conjunctiva] : normal sclera/conjunctiva [EOMI] : extraocular movements intact [Normal Outer Ear/Nose] : the outer ears and nose were normal in appearance [Normal Oropharynx] : the oropharynx was normal [Normal TMs] : both tympanic membranes were normal [Normal Nasal Mucosa] : the nasal mucosa was normal [No Lymphadenopathy] : no lymphadenopathy [Supple] : supple [Thyroid Normal, No Nodules] : the thyroid was normal and there were no nodules present [No Respiratory Distress] : no respiratory distress  [No Accessory Muscle Use] : no accessory muscle use [Clear to Auscultation] : lungs were clear to auscultation bilaterally [Normal Rate] : normal rate  [Regular Rhythm] : with a regular rhythm [Normal S1, S2] : normal S1 and S2 [No Murmur] : no murmur heard [No Edema] : there was no peripheral edema [Soft] : abdomen soft [Non Tender] : non-tender [Non-distended] : non-distended [Normal Bowel Sounds] : normal bowel sounds [Normal Supraclavicular Nodes] : no supraclavicular lymphadenopathy [Normal Posterior Cervical Nodes] : no posterior cervical lymphadenopathy [Normal Anterior Cervical Nodes] : no anterior cervical lymphadenopathy [Normal Gait] : normal gait [Speech Grossly Normal] : speech grossly normal [Memory Grossly Normal] : memory grossly normal [Normal Affect] : the affect was normal [Alert and Oriented x3] : oriented to person, place, and time [Normal Mood] : the mood was normal [Normal Insight/Judgement] : insight and judgment were intact

## 2023-08-16 NOTE — HEALTH RISK ASSESSMENT
[Good] : ~his/her~  mood as  good [Yes] : Yes [2 - 4 times a month (2 pts)] : 2-4 times a month (2 points) [No] : In the past 12 months have you used drugs other than those required for medical reasons? No [No falls in past year] : Patient reported no falls in the past year [0] : 2) Feeling down, depressed, or hopeless: Not at all (0) [de-identified] : active [de-identified] : regular [[id number]] : 0 [None] : None [With Family] : lives with family [] :  [Feels Safe at Home] : Feels safe at home [Fully functional (bathing, dressing, toileting, transferring, walking, feeding)] : Fully functional (bathing, dressing, toileting, transferring, walking, feeding) [Fully functional (using the telephone, shopping, preparing meals, housekeeping, doing laundry, using] : Fully functional and needs no help or supervision to perform IADLs (using the telephone, shopping, preparing meals, housekeeping, doing laundry, using transportation, managing medications and managing finances) [Reports changes in hearing] : Reports no changes in hearing [Reports changes in vision] : Reports no changes in vision [Reports changes in dental health] : Reports no changes in dental health [Smoke Detector] : smoke detector [Carbon Monoxide Detector] : carbon monoxide detector [Seat Belt] :  uses seat belt [Never] : Never

## 2023-11-13 LAB
25(OH)D3 SERPL-MCNC: 37.8 NG/ML
ALBUMIN SERPL ELPH-MCNC: 5.3 G/DL
ALP BLD-CCNC: 80 U/L
ALT SERPL-CCNC: 45 U/L
ANION GAP SERPL CALC-SCNC: 12 MMOL/L
APPEARANCE: CLEAR
AST SERPL-CCNC: 36 U/L
BILIRUB SERPL-MCNC: 1.5 MG/DL
BILIRUBIN URINE: NEGATIVE
BLOOD URINE: NEGATIVE
BUN SERPL-MCNC: 12 MG/DL
CALCIUM SERPL-MCNC: 10.3 MG/DL
CHLORIDE SERPL-SCNC: 99 MMOL/L
CHOLEST SERPL-MCNC: 149 MG/DL
CO2 SERPL-SCNC: 28 MMOL/L
COLOR: YELLOW
CREAT SERPL-MCNC: 1 MG/DL
EGFR: 95 ML/MIN/1.73M2
ESTIMATED AVERAGE GLUCOSE: 126 MG/DL
GLUCOSE QUALITATIVE U: NEGATIVE MG/DL
GLUCOSE SERPL-MCNC: 120 MG/DL
HBA1C MFR BLD HPLC: 6 %
HDLC SERPL-MCNC: 64 MG/DL
KETONES URINE: NEGATIVE MG/DL
LDLC SERPL CALC-MCNC: 63 MG/DL
LEUKOCYTE ESTERASE URINE: NEGATIVE
NITRITE URINE: NEGATIVE
NONHDLC SERPL-MCNC: 84 MG/DL
PH URINE: 5.5
POTASSIUM SERPL-SCNC: 4.9 MMOL/L
PROT SERPL-MCNC: 8.2 G/DL
PROTEIN URINE: NEGATIVE MG/DL
PSA SERPL-MCNC: 1.8 NG/ML
SODIUM SERPL-SCNC: 139 MMOL/L
SPECIFIC GRAVITY URINE: 1.02
T4 FREE SERPL-MCNC: 1.2 NG/DL
TRIGL SERPL-MCNC: 122 MG/DL
TSH SERPL-ACNC: 1.77 UIU/ML
UROBILINOGEN URINE: 1 MG/DL
VIT B12 SERPL-MCNC: 539 PG/ML

## 2023-11-17 ENCOUNTER — TRANSCRIPTION ENCOUNTER (OUTPATIENT)
Age: 48
End: 2023-11-17

## 2023-12-08 ENCOUNTER — APPOINTMENT (OUTPATIENT)
Dept: DERMATOLOGY | Facility: CLINIC | Age: 48
End: 2023-12-08
Payer: COMMERCIAL

## 2023-12-08 DIAGNOSIS — Z12.83 ENCOUNTER FOR SCREENING FOR MALIGNANT NEOPLASM OF SKIN: ICD-10-CM

## 2023-12-08 DIAGNOSIS — L72.9 FOLLICULAR CYST OF THE SKIN AND SUBCUTANEOUS TISSUE, UNSPECIFIED: ICD-10-CM

## 2023-12-08 DIAGNOSIS — L81.4 OTHER MELANIN HYPERPIGMENTATION: ICD-10-CM

## 2023-12-08 DIAGNOSIS — D22.9 MELANOCYTIC NEVI, UNSPECIFIED: ICD-10-CM

## 2023-12-08 PROCEDURE — 99203 OFFICE O/P NEW LOW 30 MIN: CPT

## 2024-02-09 ENCOUNTER — APPOINTMENT (OUTPATIENT)
Dept: INTERNAL MEDICINE | Facility: CLINIC | Age: 49
End: 2024-02-09
Payer: COMMERCIAL

## 2024-02-09 VITALS
SYSTOLIC BLOOD PRESSURE: 130 MMHG | TEMPERATURE: 97.2 F | OXYGEN SATURATION: 98 % | HEART RATE: 88 BPM | BODY MASS INDEX: 26.07 KG/M2 | HEIGHT: 68 IN | DIASTOLIC BLOOD PRESSURE: 84 MMHG | WEIGHT: 172 LBS

## 2024-02-09 PROBLEM — I10 ESSENTIAL (PRIMARY) HYPERTENSION: Chronic | Status: ACTIVE | Noted: 2023-01-02

## 2024-02-09 PROCEDURE — G2211 COMPLEX E/M VISIT ADD ON: CPT

## 2024-02-09 PROCEDURE — 99214 OFFICE O/P EST MOD 30 MIN: CPT

## 2024-02-09 NOTE — HISTORY OF PRESENT ILLNESS
[FreeTextEntry1] : bp check [de-identified] : ALICIA OREILLY is a 49 yo man with hypertension, hypercholesterolemia, pre diabetes, kidney stones here for a bp check.  He has been well overall. Notices some redness of skin in lower back area  Colonoscopy utd 2023.  Derm utd.    The patient is  with 3 children. He works in [employer].  He exercises regularly without difficulty.

## 2024-02-09 NOTE — PHYSICAL EXAM
[No Acute Distress] : no acute distress [Well Nourished] : well nourished [Well Developed] : well developed [Well-Appearing] : well-appearing [No Lymphadenopathy] : no lymphadenopathy [No Respiratory Distress] : no respiratory distress  [No Accessory Muscle Use] : no accessory muscle use [Clear to Auscultation] : lungs were clear to auscultation bilaterally [Normal Rate] : normal rate  [Regular Rhythm] : with a regular rhythm [Normal S1, S2] : normal S1 and S2 [No Edema] : there was no peripheral edema [Normal Gait] : normal gait [Alert and Oriented x3] : oriented to person, place, and time [de-identified] : very small area of redness lower back, buttock crack. No collection

## 2024-03-25 ENCOUNTER — APPOINTMENT (OUTPATIENT)
Dept: CARDIOLOGY | Facility: CLINIC | Age: 49
End: 2024-03-25
Payer: COMMERCIAL

## 2024-03-25 ENCOUNTER — NON-APPOINTMENT (OUTPATIENT)
Age: 49
End: 2024-03-25

## 2024-03-25 VITALS
OXYGEN SATURATION: 100 % | HEIGHT: 68 IN | HEART RATE: 92 BPM | WEIGHT: 172 LBS | SYSTOLIC BLOOD PRESSURE: 143 MMHG | BODY MASS INDEX: 26.07 KG/M2 | DIASTOLIC BLOOD PRESSURE: 93 MMHG

## 2024-03-25 VITALS — DIASTOLIC BLOOD PRESSURE: 88 MMHG | SYSTOLIC BLOOD PRESSURE: 128 MMHG

## 2024-03-25 VITALS — SYSTOLIC BLOOD PRESSURE: 149 MMHG | DIASTOLIC BLOOD PRESSURE: 91 MMHG

## 2024-03-25 DIAGNOSIS — I10 ESSENTIAL (PRIMARY) HYPERTENSION: ICD-10-CM

## 2024-03-25 DIAGNOSIS — E78.00 PURE HYPERCHOLESTEROLEMIA, UNSPECIFIED: ICD-10-CM

## 2024-03-25 PROCEDURE — 93000 ELECTROCARDIOGRAM COMPLETE: CPT

## 2024-03-25 PROCEDURE — 99203 OFFICE O/P NEW LOW 30 MIN: CPT | Mod: 25

## 2024-03-25 RX ORDER — BACITRACIN 500 [IU]/G
500 OINTMENT TOPICAL DAILY
Qty: 1 | Refills: 0 | Status: DISCONTINUED | COMMUNITY
Start: 2024-02-09 | End: 2024-03-25

## 2024-03-25 RX ORDER — BACITRACIN 500 [IU]/G
500 OINTMENT TOPICAL DAILY
Qty: 30 | Refills: 0 | Status: DISCONTINUED | COMMUNITY
Start: 2024-02-09 | End: 2024-03-25

## 2024-03-25 NOTE — PHYSICAL EXAM
[Well Developed] : well developed [Well Nourished] : well nourished [No Acute Distress] : no acute distress [Normal Conjunctiva] : normal conjunctiva [Normal Venous Pressure] : normal venous pressure [No Carotid Bruit] : no carotid bruit [Normal S1, S2] : normal S1, S2 [No Murmur] : no murmur [No Rub] : no rub [Clear Lung Fields] : clear lung fields [No Gallop] : no gallop [Good Air Entry] : good air entry [No Respiratory Distress] : no respiratory distress  [Soft] : abdomen soft [Non Tender] : non-tender [No Masses/organomegaly] : no masses/organomegaly [Normal Gait] : normal gait [Normal Bowel Sounds] : normal bowel sounds [No Edema] : no edema [No Cyanosis] : no cyanosis [No Varicosities] : no varicosities [No Clubbing] : no clubbing [No Rash] : no rash [No Skin Lesions] : no skin lesions [No Focal Deficits] : no focal deficits [Moves all extremities] : moves all extremities [Normal Speech] : normal speech [Normal memory] : normal memory [Alert and Oriented] : alert and oriented

## 2024-04-15 NOTE — HISTORY OF PRESENT ILLNESS
[FreeTextEntry1] : He has HTN on Valsartan. He checks at home and it is in range. It tends to be high in the doctors office.  CCTA was done as part of an executive physical which showed focal calcified plaque in prox LAD. CAC around 23 per patients Stress 3/24/23 10:32 on the Fei. No symptoms. No significant ST changes. Father passed of SCD. He had bicuspid valve.  He has been screened. No findings Bicuspid.   Current LDL 63 was 139 prior to starting statin.  A1c 6%  More active now. He does F45. 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  Diet - Limiting red meat. Sweets rare.  Sleep - Sleep is poor.

## 2024-04-15 NOTE — DISCUSSION/SUMMARY
[FreeTextEntry1] : Continue same medications.  CCTA done 1 year ago no utility to repeating. Statin started.  Lifestyle modifications discussed inclusive of diet and exercise strategies, sleep optimization and stress reduction.  Follow up in 1 year.  [EKG obtained to assist in diagnosis and management of assessed problem(s)] : EKG obtained to assist in diagnosis and management of assessed problem(s)

## 2024-04-15 NOTE — CARDIOLOGY SUMMARY
Shanda Young RN     [de-identified] : NSR. NO acute ST-T changes

## 2024-05-10 RX ORDER — VALSARTAN 40 MG/1
40 TABLET, COATED ORAL
Qty: 90 | Refills: 1 | Status: ACTIVE | COMMUNITY
Start: 1900-01-01 | End: 1900-01-01

## 2024-05-16 ENCOUNTER — TRANSCRIPTION ENCOUNTER (OUTPATIENT)
Age: 49
End: 2024-05-16

## 2024-05-16 RX ORDER — ROSUVASTATIN CALCIUM 10 MG/1
10 TABLET, FILM COATED ORAL
Qty: 90 | Refills: 0 | Status: ACTIVE | COMMUNITY
Start: 1900-01-01 | End: 1900-01-01

## 2024-08-10 LAB
25(OH)D3 SERPL-MCNC: 34.1 NG/ML
ALBUMIN SERPL ELPH-MCNC: 4.9 G/DL
ALP BLD-CCNC: 68 U/L
ALT SERPL-CCNC: 33 U/L
ANION GAP SERPL CALC-SCNC: 10 MMOL/L
APPEARANCE: CLEAR
AST SERPL-CCNC: 24 U/L
BASOPHILS # BLD AUTO: 0.04 K/UL
BASOPHILS NFR BLD AUTO: 0.7 %
BILIRUB SERPL-MCNC: 0.5 MG/DL
BILIRUBIN URINE: NEGATIVE
BLOOD URINE: NEGATIVE
BUN SERPL-MCNC: 9 MG/DL
CALCIUM SERPL-MCNC: 9.7 MG/DL
CHLORIDE SERPL-SCNC: 104 MMOL/L
CHOLEST SERPL-MCNC: 128 MG/DL
CO2 SERPL-SCNC: 28 MMOL/L
COLOR: YELLOW
CREAT SERPL-MCNC: 0.96 MG/DL
EGFR: 99 ML/MIN/1.73M2
EOSINOPHIL # BLD AUTO: 0.18 K/UL
EOSINOPHIL NFR BLD AUTO: 3.3 %
ESTIMATED AVERAGE GLUCOSE: 137 MG/DL
GLUCOSE QUALITATIVE U: NEGATIVE MG/DL
GLUCOSE SERPL-MCNC: 134 MG/DL
HBA1C MFR BLD HPLC: 6.4 %
HCT VFR BLD CALC: 41.3 %
HDLC SERPL-MCNC: 53 MG/DL
HGB BLD-MCNC: 14.5 G/DL
IMM GRANULOCYTES NFR BLD AUTO: 0.2 %
KETONES URINE: NEGATIVE MG/DL
LDLC SERPL CALC-MCNC: 56 MG/DL
LEUKOCYTE ESTERASE URINE: NEGATIVE
LYMPHOCYTES # BLD AUTO: 1.96 K/UL
LYMPHOCYTES NFR BLD AUTO: 35.4 %
MAN DIFF?: NORMAL
MCHC RBC-ENTMCNC: 31 PG
MCHC RBC-ENTMCNC: 35.1 GM/DL
MCV RBC AUTO: 88.2 FL
MONOCYTES # BLD AUTO: 0.51 K/UL
MONOCYTES NFR BLD AUTO: 9.2 %
NEUTROPHILS # BLD AUTO: 2.83 K/UL
NEUTROPHILS NFR BLD AUTO: 51.2 %
NITRITE URINE: NEGATIVE
NONHDLC SERPL-MCNC: 75 MG/DL
PH URINE: 5.5
PLATELET # BLD AUTO: 236 K/UL
POTASSIUM SERPL-SCNC: 5.4 MMOL/L
PROT SERPL-MCNC: 7.5 G/DL
PROTEIN URINE: NEGATIVE MG/DL
PSA SERPL-MCNC: 1.77 NG/ML
RBC # BLD: 4.68 M/UL
RBC # FLD: 12.1 %
SODIUM SERPL-SCNC: 142 MMOL/L
SPECIFIC GRAVITY URINE: 1.02
T4 FREE SERPL-MCNC: 1 NG/DL
TRIGL SERPL-MCNC: 105 MG/DL
TSH SERPL-ACNC: 1.24 UIU/ML
UROBILINOGEN URINE: 0.2 MG/DL
VIT B12 SERPL-MCNC: 427 PG/ML
WBC # FLD AUTO: 5.53 K/UL

## 2024-08-16 ENCOUNTER — APPOINTMENT (OUTPATIENT)
Dept: INTERNAL MEDICINE | Facility: CLINIC | Age: 49
End: 2024-08-16
Payer: COMMERCIAL

## 2024-08-16 VITALS
TEMPERATURE: 97.9 F | WEIGHT: 175 LBS | BODY MASS INDEX: 26.52 KG/M2 | OXYGEN SATURATION: 99 % | HEART RATE: 89 BPM | HEIGHT: 68 IN | DIASTOLIC BLOOD PRESSURE: 90 MMHG | SYSTOLIC BLOOD PRESSURE: 120 MMHG

## 2024-08-16 DIAGNOSIS — E78.00 PURE HYPERCHOLESTEROLEMIA, UNSPECIFIED: ICD-10-CM

## 2024-08-16 DIAGNOSIS — I10 ESSENTIAL (PRIMARY) HYPERTENSION: ICD-10-CM

## 2024-08-16 DIAGNOSIS — Z00.00 ENCOUNTER FOR GENERAL ADULT MEDICAL EXAMINATION W/OUT ABNORMAL FINDINGS: ICD-10-CM

## 2024-08-16 PROCEDURE — 99396 PREV VISIT EST AGE 40-64: CPT

## 2024-08-16 NOTE — ASSESSMENT
[FreeTextEntry1] : HCM Labs discussed with pt today Colonoscopy utd 2023 Derm due later this year continue current meds CDE advised repeat labs in 3 months Bp check in 6 months

## 2024-08-16 NOTE — HISTORY OF PRESENT ILLNESS
[FreeTextEntry1] : Annual Physical [de-identified] : ALICIA OREILLY is a 49 yo man with hypertension, hypercholesterolemia, pre diabetes, kidney stones here for a physical.  He has been well overall.  Hba1c discussed.  Low carb, CDE, repeat labs in 3 months. Metformin and ozempic discussed.  Colonoscopy utd 2023.  Derm due later this year.    The patient is  with 3 children. 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  He exercises regularly without difficulty.

## 2024-08-16 NOTE — HEALTH RISK ASSESSMENT
[Good] : ~his/her~  mood as  good [Yes] : Yes [2 - 4 times a month (2 pts)] : 2-4 times a month (2 points) [No] : In the past 12 months have you used drugs other than those required for medical reasons? No [No falls in past year] : Patient reported no falls in the past year [0] : 2) Feeling down, depressed, or hopeless: Not at all (0) [Never] : Never [None] : None [With Family] : lives with family [] :  [Feels Safe at Home] : Feels safe at home [Fully functional (bathing, dressing, toileting, transferring, walking, feeding)] : Fully functional (bathing, dressing, toileting, transferring, walking, feeding) [Fully functional (using the telephone, shopping, preparing meals, housekeeping, doing laundry, using] : Fully functional and needs no help or supervision to perform IADLs (using the telephone, shopping, preparing meals, housekeeping, doing laundry, using transportation, managing medications and managing finances) [Smoke Detector] : smoke detector [Carbon Monoxide Detector] : carbon monoxide detector [Seat Belt] :  uses seat belt [de-identified] : active [[id number]] : 0 [de-identified] : regular [Reports changes in hearing] : Reports no changes in hearing [Reports changes in vision] : Reports no changes in vision [Reports changes in dental health] : Reports no changes in dental health

## 2024-08-19 ENCOUNTER — TRANSCRIPTION ENCOUNTER (OUTPATIENT)
Age: 49
End: 2024-08-19

## 2024-09-06 ENCOUNTER — APPOINTMENT (OUTPATIENT)
Dept: CARDIOLOGY | Facility: CLINIC | Age: 49
End: 2024-09-06

## 2024-09-06 VITALS — BODY MASS INDEX: 25.85 KG/M2 | WEIGHT: 170 LBS

## 2024-09-06 DIAGNOSIS — R73.03 PREDIABETES.: ICD-10-CM

## 2024-09-06 DIAGNOSIS — E78.00 PURE HYPERCHOLESTEROLEMIA, UNSPECIFIED: ICD-10-CM

## 2024-09-06 PROCEDURE — 97802 MEDICAL NUTRITION INDIV IN: CPT

## 2024-11-16 ENCOUNTER — HOSPITAL ENCOUNTER (EMERGENCY)
Facility: HOSPITAL | Age: 49
Discharge: HOME | End: 2024-11-16
Attending: EMERGENCY MEDICINE
Payer: COMMERCIAL

## 2024-11-16 VITALS
HEIGHT: 68 IN | TEMPERATURE: 98 F | SYSTOLIC BLOOD PRESSURE: 148 MMHG | BODY MASS INDEX: 24.86 KG/M2 | OXYGEN SATURATION: 100 % | RESPIRATION RATE: 16 BRPM | DIASTOLIC BLOOD PRESSURE: 96 MMHG | HEART RATE: 112 BPM | WEIGHT: 164 LBS

## 2024-11-16 DIAGNOSIS — S02.69XA: ICD-10-CM

## 2024-11-16 DIAGNOSIS — S02.631A: ICD-10-CM
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
## 2024-11-16 DIAGNOSIS — S01.511A LIP LACERATION, INITIAL ENCOUNTER: ICD-10-CM

## 2024-11-16 DIAGNOSIS — S01.81XA CHIN LACERATION, INITIAL ENCOUNTER: ICD-10-CM

## 2024-11-16 DIAGNOSIS — S02.632A: ICD-10-CM

## 2024-11-16 DIAGNOSIS — S09.90XA INJURY OF HEAD, INITIAL ENCOUNTER: Primary | ICD-10-CM

## 2024-11-16 DIAGNOSIS — R55 SYNCOPE: ICD-10-CM

## 2024-11-16 PROBLEM — S02.611A CLOSED FRACTURE OF RIGHT CONDYLAR PROCESS OF MANDIBLE: Status: ACTIVE | Noted: 2024-11-16

## 2024-11-16 LAB
ALBUMIN SERPL BCP-MCNC: 4.7 G/DL (ref 3.5–5.2)
ALP SERPL-CCNC: 67 U/L (ref 40–150)
ALT SERPL W/O P-5'-P-CCNC: 69 U/L (ref 10–44)
ANION GAP SERPL CALC-SCNC: 13 MMOL/L (ref 8–16)
AST SERPL-CCNC: 75 U/L (ref 10–40)
BASOPHILS # BLD AUTO: 0.04 K/UL (ref 0–0.2)
BASOPHILS NFR BLD: 0.4 % (ref 0–1.9)
BILIRUB SERPL-MCNC: 1.3 MG/DL (ref 0.1–1)
BUN SERPL-MCNC: 15 MG/DL (ref 6–20)
CALCIUM SERPL-MCNC: 9.3 MG/DL (ref 8.7–10.5)
CHLORIDE SERPL-SCNC: 101 MMOL/L (ref 95–110)
CO2 SERPL-SCNC: 25 MMOL/L (ref 23–29)
CREAT SERPL-MCNC: 1 MG/DL (ref 0.5–1.4)
DIFFERENTIAL METHOD BLD: ABNORMAL
EOSINOPHIL # BLD AUTO: 0 K/UL (ref 0–0.5)
EOSINOPHIL NFR BLD: 0.3 % (ref 0–8)
ERYTHROCYTE [DISTWIDTH] IN BLOOD BY AUTOMATED COUNT: 11.9 % (ref 11.5–14.5)
EST. GFR  (NO RACE VARIABLE): >60 ML/MIN/1.73 M^2
GLUCOSE SERPL-MCNC: 121 MG/DL (ref 70–110)
HCT VFR BLD AUTO: 44.5 % (ref 40–54)
HCV AB SERPL QL IA: NORMAL
HGB BLD-MCNC: 15.6 G/DL (ref 14–18)
HIV 1+2 AB+HIV1 P24 AG SERPL QL IA: NORMAL
IMM GRANULOCYTES # BLD AUTO: 0.04 K/UL (ref 0–0.04)
IMM GRANULOCYTES NFR BLD AUTO: 0.4 % (ref 0–0.5)
LYMPHOCYTES # BLD AUTO: 1.2 K/UL (ref 1–4.8)
LYMPHOCYTES NFR BLD: 12.2 % (ref 18–48)
MCH RBC QN AUTO: 30.7 PG (ref 27–31)
MCHC RBC AUTO-ENTMCNC: 35.1 G/DL (ref 32–36)
MCV RBC AUTO: 88 FL (ref 82–98)
MONOCYTES # BLD AUTO: 0.8 K/UL (ref 0.3–1)
MONOCYTES NFR BLD: 7.9 % (ref 4–15)
NEUTROPHILS # BLD AUTO: 8 K/UL (ref 1.8–7.7)
NEUTROPHILS NFR BLD: 78.8 % (ref 38–73)
NRBC BLD-RTO: 0 /100 WBC
OHS QRS DURATION: 88 MS
OHS QTC CALCULATION: 467 MS
PLATELET # BLD AUTO: 210 K/UL (ref 150–450)
PMV BLD AUTO: 10.2 FL (ref 9.2–12.9)
POCT GLUCOSE: 133 MG/DL (ref 70–110)
POTASSIUM SERPL-SCNC: 4 MMOL/L (ref 3.5–5.1)
PROT SERPL-MCNC: 8.4 G/DL (ref 6–8.4)
RBC # BLD AUTO: 5.08 M/UL (ref 4.6–6.2)
SODIUM SERPL-SCNC: 139 MMOL/L (ref 136–145)
TROPONIN I SERPL DL<=0.01 NG/ML-MCNC: <0.006 NG/ML (ref 0–0.03)
WBC # BLD AUTO: 10.12 K/UL (ref 3.9–12.7)

## 2024-11-16 PROCEDURE — 63600175 PHARM REV CODE 636 W HCPCS: Performed by: PHYSICIAN ASSISTANT

## 2024-11-16 PROCEDURE — 84484 ASSAY OF TROPONIN QUANT: CPT | Performed by: PHYSICIAN ASSISTANT

## 2024-11-16 PROCEDURE — 85025 COMPLETE CBC W/AUTO DIFF WBC: CPT | Performed by: PHYSICIAN ASSISTANT

## 2024-11-16 PROCEDURE — 90471 IMMUNIZATION ADMIN: CPT | Performed by: PHYSICIAN ASSISTANT

## 2024-11-16 PROCEDURE — 93005 ELECTROCARDIOGRAM TRACING: CPT

## 2024-11-16 PROCEDURE — 25000003 PHARM REV CODE 250: Performed by: PHYSICIAN ASSISTANT

## 2024-11-16 PROCEDURE — 96365 THER/PROPH/DIAG IV INF INIT: CPT

## 2024-11-16 PROCEDURE — 25000003 PHARM REV CODE 250: Performed by: EMERGENCY MEDICINE

## 2024-11-16 PROCEDURE — 25000003 PHARM REV CODE 250: Performed by: STUDENT IN AN ORGANIZED HEALTH CARE EDUCATION/TRAINING PROGRAM

## 2024-11-16 PROCEDURE — 80053 COMPREHEN METABOLIC PANEL: CPT | Performed by: PHYSICIAN ASSISTANT

## 2024-11-16 PROCEDURE — 12053 INTMD RPR FACE/MM 5.1-7.5 CM: CPT

## 2024-11-16 PROCEDURE — 86803 HEPATITIS C AB TEST: CPT | Performed by: PHYSICIAN ASSISTANT

## 2024-11-16 PROCEDURE — 82962 GLUCOSE BLOOD TEST: CPT

## 2024-11-16 PROCEDURE — 93010 ELECTROCARDIOGRAM REPORT: CPT | Mod: ,,, | Performed by: INTERNAL MEDICINE

## 2024-11-16 PROCEDURE — 87389 HIV-1 AG W/HIV-1&-2 AB AG IA: CPT | Performed by: PHYSICIAN ASSISTANT

## 2024-11-16 PROCEDURE — 90715 TDAP VACCINE 7 YRS/> IM: CPT | Performed by: PHYSICIAN ASSISTANT

## 2024-11-16 PROCEDURE — 63600175 PHARM REV CODE 636 W HCPCS: Performed by: EMERGENCY MEDICINE

## 2024-11-16 PROCEDURE — 99285 EMERGENCY DEPT VISIT HI MDM: CPT | Mod: 25

## 2024-11-16 RX ORDER — OXYCODONE HYDROCHLORIDE 5 MG/1
5 TABLET ORAL
Status: DISCONTINUED | OUTPATIENT
Start: 2024-11-16 | End: 2024-11-16 | Stop reason: HOSPADM

## 2024-11-16 RX ORDER — IBUPROFEN 600 MG/1
600 TABLET ORAL
Status: COMPLETED | OUTPATIENT
Start: 2024-11-16 | End: 2024-11-16

## 2024-11-16 RX ORDER — ACETAMINOPHEN 500 MG
1000 TABLET ORAL
Status: COMPLETED | OUTPATIENT
Start: 2024-11-16 | End: 2024-11-16

## 2024-11-16 RX ORDER — MORPHINE SULFATE 4 MG/ML
4 INJECTION, SOLUTION INTRAMUSCULAR; INTRAVENOUS
Status: DISCONTINUED | OUTPATIENT
Start: 2024-11-16 | End: 2024-11-16 | Stop reason: HOSPADM

## 2024-11-16 RX ORDER — ONDANSETRON HYDROCHLORIDE 2 MG/ML
4 INJECTION, SOLUTION INTRAVENOUS
Status: DISCONTINUED | OUTPATIENT
Start: 2024-11-16 | End: 2024-11-16 | Stop reason: HOSPADM

## 2024-11-16 RX ORDER — BACITRACIN ZINC 500 [USP'U]/G
1 OINTMENT TOPICAL
Status: DISCONTINUED | OUTPATIENT
Start: 2024-11-16 | End: 2024-11-16

## 2024-11-16 RX ORDER — AMOXICILLIN AND CLAVULANATE POTASSIUM 875; 125 MG/1; MG/1
1 TABLET, FILM COATED ORAL 2 TIMES DAILY
Qty: 14 TABLET | Refills: 0 | Status: SHIPPED | OUTPATIENT
Start: 2024-11-16

## 2024-11-16 RX ORDER — LIDOCAINE HYDROCHLORIDE 10 MG/ML
10 INJECTION, SOLUTION EPIDURAL; INFILTRATION; INTRACAUDAL; PERINEURAL
Status: DISCONTINUED | OUTPATIENT
Start: 2024-11-16 | End: 2024-11-16 | Stop reason: HOSPADM

## 2024-11-16 RX ORDER — LIDOCAINE HYDROCHLORIDE 10 MG/ML
10 INJECTION, SOLUTION EPIDURAL; INFILTRATION; INTRACAUDAL; PERINEURAL
Status: COMPLETED | OUTPATIENT
Start: 2024-11-16 | End: 2024-11-16

## 2024-11-16 RX ORDER — BACITRACIN ZINC 500 [USP'U]/G
OINTMENT TOPICAL 2 TIMES DAILY
Status: DISCONTINUED | OUTPATIENT
Start: 2024-11-16 | End: 2024-11-16 | Stop reason: HOSPADM

## 2024-11-16 RX ORDER — LIDOCAINE HYDROCHLORIDE 10 MG/ML
10 INJECTION, SOLUTION EPIDURAL; INFILTRATION; INTRACAUDAL; PERINEURAL
Status: DISCONTINUED | OUTPATIENT
Start: 2024-11-16 | End: 2024-11-16

## 2024-11-16 RX ORDER — OFLOXACIN 3 MG/ML
4 SOLUTION AURICULAR (OTIC) 2 TIMES DAILY
Qty: 10 ML | Refills: 0 | Status: SHIPPED | OUTPATIENT
Start: 2024-11-16 | End: 2024-12-05

## 2024-11-16 RX ADMIN — ACETAMINOPHEN 1000 MG: 500 TABLET ORAL at 01:11

## 2024-11-16 RX ADMIN — TETANUS TOXOID, REDUCED DIPHTHERIA TOXOID AND ACELLULAR PERTUSSIS VACCINE, ADSORBED 0.5 ML: 5; 2.5; 8; 8; 2.5 SUSPENSION INTRAMUSCULAR at 01:11

## 2024-11-16 RX ADMIN — BACITRACIN 1 EACH: 500 OINTMENT TOPICAL at 08:11

## 2024-11-16 RX ADMIN — LIDOCAINE HYDROCHLORIDE 100 MG: 10 INJECTION, SOLUTION EPIDURAL; INFILTRATION; INTRACAUDAL at 07:11

## 2024-11-16 RX ADMIN — IBUPROFEN 600 MG: 600 TABLET, FILM COATED ORAL at 07:11

## 2024-11-16 RX ADMIN — AMPICILLIN SODIUM AND SULBACTAM SODIUM 3 G: 2; 1 INJECTION, POWDER, FOR SOLUTION INTRAMUSCULAR; INTRAVENOUS at 04:11

## 2024-11-16 NOTE — PROVIDER PROGRESS NOTES - EMERGENCY DEPT.
Encounter Date: 11/15/2024    ED Physician Progress Notes        ED Physician Hand-off Note:    ED Course: I assumed care of patient from off-going ED physician team. Briefly, Patient is a 47 yo M who presents with lip and chin laceration,tooth injury, bilateral jaw pain after a syncopal episode in the bathroom.   She also noted blood to the R TM    At the time of signout plan was pending - CT, reassessment    Medications given in the ED:    Medications   acetaminophen tablet 1,000 mg (1,000 mg Oral Given 11/16/24 0148)   Tdap (BOOSTRIX) vaccine injection 0.5 mL (0.5 mLs Intramuscular Given 11/16/24 0116)   ampicillin-sulbactam (UNASYN) 3 g in 0.9% NaCl 100 mL IVPB (MB+) (0 g Intravenous Stopped 11/16/24 0557)   LIDOcaine (PF) 10 mg/ml (1%) injection 100 mg (100 mg Infiltration Given 11/16/24 0745)   ibuprofen tablet 600 mg (600 mg Oral Given 11/16/24 0745)     Imaging Results              CT Temporal Bone without contrast (Final result)  Result time 11/16/24 10:58:46      Final result by Azeem Montague DO (11/16/24 10:58:46)                   Impression:      Comminuted fracture deformity posterior roof of the right glenoid fossa involving the anterior inferior wall of the bony EAC and attachment site of the tympanic membrane as detailed above.  There is soft tissue density material along the tympanic membrane concerning for possible hematoma and component of tympanic membrane perforation not excluded clinical correlation advised.    Comminuted fracture dislocation right mandibular condyle partially included in the field of view.      Electronically signed by: Azeem Montague DO  Date:    11/16/2024  Time:    10:58               Narrative:    EXAMINATION:  CT TEMPORAL BONE WITHOUT CONTRAST    CLINICAL HISTORY:  trauma;    TECHNIQUE:  Low dose axial images were acquired through the temporal bones and skull base without contrast administration.  Coronal and sagittal reconstructions were performed.    COMPARISON:  CT  maxillofacial bone earlier today 11/16/2024    FINDINGS:  Right side: There is fracture deformity involving the ventral medial wall of the right bony EAC with slight dorsal displacement.  Displacement of approximately 2 mm best seen on image 123 series 3.  This involves the attachment site of the tympanic membrane with there is resultant shortening of the tympanic membrane with intermediate density along the tympanic membrane.  The right middle ear ossicular chain is grossly intact.  There is small material along the inferior aspect of the tympanic membrane cannot exclude component of tympanic membrane perforation.    No significant opacification right middle ear cavity or mastoid air cells    Bony coverings of the right vestibule, cochlea and semicircular canals are intact.    Redemonstration condylar comminuted fracture and dislocation right mandible condyle with gas and possible hemorrhage within the glenoid fossa.    Please see prior CT maxillofacial bones for further details    Left side: Trace opacification left lateral EAC suggestive for possible cerumen.  No significant opacification left mastoid air cells and middle ear cavity.  Left middle ear ossicular chain is intact    Bony coverings left vestibule, cochlea and semicircular canals are intact                                        CT Cervical Spine Without Contrast (Final result)  Result time 11/16/24 04:01:36      Final result by Roger Renae MD (11/16/24 04:01:36)                   Impression:      Head CT, Maxillofacial CT, and Cervical Spine CT:    No acute intracranial hemorrhage.    Multiple mandible fractures and comminuted fracture dislocation of the right TMJ, as detailed in the body of the report.    Subtle fracture questioned in the right lateral pterygoid plate.    Possible dental chip fracture arising from the lingual aspect of the right 1st mandibular bicuspid tooth.    No acute cervical vertebral fracture deformity or  subluxation.    Mild multilevel posterior cervical disc protrusions.    Additional observations as detailed in the body of the report.    These results require clinical correlation.    This report was flagged in Epic as abnormal.      Electronically signed by: Roger Renae  Date:    11/16/2024  Time:    04:01               Narrative:    EXAMINATION:  CT HEAD WITHOUT CONTRAST; CT CERVICAL SPINE WITHOUT CONTRAST; CT MAXILLOFACIAL WITHOUT CONTRAST    CLINICAL HISTORY:  Facial trauma, blunt;; Polytrauma, blunt;    TECHNIQUE:  Low dose axial images were obtained through the head.  Coronal and sagittal reformations were also performed. Contrast was not administered.    Low dose axial images were obtained through the maxillofacial bones.  Coronal and sagittal reformations were also performed. Contrast was not administered.    Low dose axial images were obtained through the cervical spine.  Coronal and sagittal reformations were also performed. Contrast was not administered.    COMPARISON:  None.    FINDINGS:  Artifacts related to beam hardening and/or motion degrade portions of these scans.    Head CT:    No depressed calvarial fracture, acute intracranial hemorrhage, discrete acute large vascular territory brain infarct, midline shift, brain herniation, or acute pathologic extra-axial fluid collection.  There is a low-attenuation prominence of the cisterna magna, likely a developmental variant.  An arachnoid cyst or other very low-attenuation extra-axial lesion in this region is not fully excluded.    Maxillofacial CT:    Multiple mandible fractures:    - hairline fracture across the base of the left coronoid process, best seen on sagittal images (series 604, images 48-50).    - left mandible parasymphyseal, extending between the roots of the left medial and lateral mandibular incisor teeth, to the alveolar ridge, also involving the socket of the left mandibular lateral incisor tooth.    - minimally displaced fracture  across the base of the right maxillary coronoid process.    - comminuted displaced fracture of the right mandibular condyle, with anterior dislocation of the condylar head from the right TMJ.    - associated dorsally displaced comminuted fracture of the posterior wall of the right TMJ, with osseous fragments encroaching upon the right external auditory canal and right middle ear cavity.    No dislocation of the left TMJ.    A minimally depressed fracture of the right lateral pterygoid plate is questioned on coronal images (series 605, image 91).    A punctate opacity projecting in the right side of the oral cavity possibly represents a pre-existing soft tissue calcification of the tongue, versus an intraoral foreign body, or possibly a small displaced dental chip fracture fragment (series 4, image 127). A questionable donor site is suggested in the lingual aspect of the right 1st mandibular bicuspid tooth (series 4, image 121).    Otherwise, the maxilla, maxillary sinus walls, zygomatic arches, pterygoid plates, bones of the orbits, nasal skeleton, and remaining visualized maxillofacial and skull base bone demonstrate no acute fracture deformity.    The ocular globes, lenses, and postseptal intraorbital soft tissues demonstrate no acute abnormalities.    Allowing for some presumed developmental asymmetry, the hyoid bone demonstrates no acute fracture deformity.    On axial images, there is a left paramedian parasagittally oriented lucency projecting across the ventral apex of the thyroid cartilage.  Uncertain if this represents an acute or pre-existing finding, such as an acute or pre-existing thyroid cartilage fracture.  This might also represent a developmental variant.  There is no adjacent soft tissue swelling apparent to further suggest acute fracture.    Minimal scattered mucosal disease in the paranasal sinuses.    Several maxillary dental roots project into the maxillary sinuses bilaterally.  There remains  an impacted left maxillary 3rd molar tooth is root substantially protrude into the left maxillary sinus.  The 3 other third molar/wisdom teeth are absent.    Cervical Spine CT:    Normal alignment of the cervical vertebra, with preserved cervical lordosis.    No acute cervical vertebral fracture deformity is demonstrated.  No cervical prevertebral soft tissue swelling is demonstrated.  Intraspinal soft tissue detail is not optimally visualized, especially below C5-C6.  Allowing for this limitation, multiple small multilevel posterior disc protrusions are suggested, most apparent at C3-4, C4-5, C5-6, and C6-C7.    The visualized airway is patent.  Normal sized thyroid.  Subcentimeter hypodensity in the left thyroid lobe, possibly a nodule etiology uncertain but not fully characterized but likely not clinically significant, based on its size.    Visualized portions of the upper thoracic viscera demonstrate no noncontrast CT evidence of acute injury.                                        CT Head Without Contrast (Final result)  Result time 11/16/24 04:01:36      Final result by Roger Renae MD (11/16/24 04:01:36)                   Impression:      Head CT, Maxillofacial CT, and Cervical Spine CT:    No acute intracranial hemorrhage.    Multiple mandible fractures and comminuted fracture dislocation of the right TMJ, as detailed in the body of the report.    Subtle fracture questioned in the right lateral pterygoid plate.    Possible dental chip fracture arising from the lingual aspect of the right 1st mandibular bicuspid tooth.    No acute cervical vertebral fracture deformity or subluxation.    Mild multilevel posterior cervical disc protrusions.    Additional observations as detailed in the body of the report.    These results require clinical correlation.    This report was flagged in Epic as abnormal.      Electronically signed by: Roger Renae  Date:    11/16/2024  Time:    04:01               Narrative:     EXAMINATION:  CT HEAD WITHOUT CONTRAST; CT CERVICAL SPINE WITHOUT CONTRAST; CT MAXILLOFACIAL WITHOUT CONTRAST    CLINICAL HISTORY:  Facial trauma, blunt;; Polytrauma, blunt;    TECHNIQUE:  Low dose axial images were obtained through the head.  Coronal and sagittal reformations were also performed. Contrast was not administered.    Low dose axial images were obtained through the maxillofacial bones.  Coronal and sagittal reformations were also performed. Contrast was not administered.    Low dose axial images were obtained through the cervical spine.  Coronal and sagittal reformations were also performed. Contrast was not administered.    COMPARISON:  None.    FINDINGS:  Artifacts related to beam hardening and/or motion degrade portions of these scans.    Head CT:    No depressed calvarial fracture, acute intracranial hemorrhage, discrete acute large vascular territory brain infarct, midline shift, brain herniation, or acute pathologic extra-axial fluid collection.  There is a low-attenuation prominence of the cisterna magna, likely a developmental variant.  An arachnoid cyst or other very low-attenuation extra-axial lesion in this region is not fully excluded.    Maxillofacial CT:    Multiple mandible fractures:    - hairline fracture across the base of the left coronoid process, best seen on sagittal images (series 604, images 48-50).    - left mandible parasymphyseal, extending between the roots of the left medial and lateral mandibular incisor teeth, to the alveolar ridge, also involving the socket of the left mandibular lateral incisor tooth.    - minimally displaced fracture across the base of the right maxillary coronoid process.    - comminuted displaced fracture of the right mandibular condyle, with anterior dislocation of the condylar head from the right TMJ.    - associated dorsally displaced comminuted fracture of the posterior wall of the right TMJ, with osseous fragments encroaching upon the right  external auditory canal and right middle ear cavity.    No dislocation of the left TMJ.    A minimally depressed fracture of the right lateral pterygoid plate is questioned on coronal images (series 605, image 91).    A punctate opacity projecting in the right side of the oral cavity possibly represents a pre-existing soft tissue calcification of the tongue, versus an intraoral foreign body, or possibly a small displaced dental chip fracture fragment (series 4, image 127). A questionable donor site is suggested in the lingual aspect of the right 1st mandibular bicuspid tooth (series 4, image 121).    Otherwise, the maxilla, maxillary sinus walls, zygomatic arches, pterygoid plates, bones of the orbits, nasal skeleton, and remaining visualized maxillofacial and skull base bone demonstrate no acute fracture deformity.    The ocular globes, lenses, and postseptal intraorbital soft tissues demonstrate no acute abnormalities.    Allowing for some presumed developmental asymmetry, the hyoid bone demonstrates no acute fracture deformity.    On axial images, there is a left paramedian parasagittally oriented lucency projecting across the ventral apex of the thyroid cartilage.  Uncertain if this represents an acute or pre-existing finding, such as an acute or pre-existing thyroid cartilage fracture.  This might also represent a developmental variant.  There is no adjacent soft tissue swelling apparent to further suggest acute fracture.    Minimal scattered mucosal disease in the paranasal sinuses.    Several maxillary dental roots project into the maxillary sinuses bilaterally.  There remains an impacted left maxillary 3rd molar tooth is root substantially protrude into the left maxillary sinus.  The 3 other third molar/wisdom teeth are absent.    Cervical Spine CT:    Normal alignment of the cervical vertebra, with preserved cervical lordosis.    No acute cervical vertebral fracture deformity is demonstrated.  No cervical  prevertebral soft tissue swelling is demonstrated.  Intraspinal soft tissue detail is not optimally visualized, especially below C5-C6.  Allowing for this limitation, multiple small multilevel posterior disc protrusions are suggested, most apparent at C3-4, C4-5, C5-6, and C6-C7.    The visualized airway is patent.  Normal sized thyroid.  Subcentimeter hypodensity in the left thyroid lobe, possibly a nodule etiology uncertain but not fully characterized but likely not clinically significant, based on its size.    Visualized portions of the upper thoracic viscera demonstrate no noncontrast CT evidence of acute injury.                                        CT Maxillofacial Without Contrast (Final result)  Result time 11/16/24 04:01:36      Final result by Roger Renae MD (11/16/24 04:01:36)                   Impression:      Head CT, Maxillofacial CT, and Cervical Spine CT:    No acute intracranial hemorrhage.    Multiple mandible fractures and comminuted fracture dislocation of the right TMJ, as detailed in the body of the report.    Subtle fracture questioned in the right lateral pterygoid plate.    Possible dental chip fracture arising from the lingual aspect of the right 1st mandibular bicuspid tooth.    No acute cervical vertebral fracture deformity or subluxation.    Mild multilevel posterior cervical disc protrusions.    Additional observations as detailed in the body of the report.    These results require clinical correlation.    This report was flagged in Epic as abnormal.      Electronically signed by: Roger Renae  Date:    11/16/2024  Time:    04:01               Narrative:    EXAMINATION:  CT HEAD WITHOUT CONTRAST; CT CERVICAL SPINE WITHOUT CONTRAST; CT MAXILLOFACIAL WITHOUT CONTRAST    CLINICAL HISTORY:  Facial trauma, blunt;; Polytrauma, blunt;    TECHNIQUE:  Low dose axial images were obtained through the head.  Coronal and sagittal reformations were also performed. Contrast was not  administered.    Low dose axial images were obtained through the maxillofacial bones.  Coronal and sagittal reformations were also performed. Contrast was not administered.    Low dose axial images were obtained through the cervical spine.  Coronal and sagittal reformations were also performed. Contrast was not administered.    COMPARISON:  None.    FINDINGS:  Artifacts related to beam hardening and/or motion degrade portions of these scans.    Head CT:    No depressed calvarial fracture, acute intracranial hemorrhage, discrete acute large vascular territory brain infarct, midline shift, brain herniation, or acute pathologic extra-axial fluid collection.  There is a low-attenuation prominence of the cisterna magna, likely a developmental variant.  An arachnoid cyst or other very low-attenuation extra-axial lesion in this region is not fully excluded.    Maxillofacial CT:    Multiple mandible fractures:    - hairline fracture across the base of the left coronoid process, best seen on sagittal images (series 604, images 48-50).    - left mandible parasymphyseal, extending between the roots of the left medial and lateral mandibular incisor teeth, to the alveolar ridge, also involving the socket of the left mandibular lateral incisor tooth.    - minimally displaced fracture across the base of the right maxillary coronoid process.    - comminuted displaced fracture of the right mandibular condyle, with anterior dislocation of the condylar head from the right TMJ.    - associated dorsally displaced comminuted fracture of the posterior wall of the right TMJ, with osseous fragments encroaching upon the right external auditory canal and right middle ear cavity.    No dislocation of the left TMJ.    A minimally depressed fracture of the right lateral pterygoid plate is questioned on coronal images (series 605, image 91).    A punctate opacity projecting in the right side of the oral cavity possibly represents a pre-existing  soft tissue calcification of the tongue, versus an intraoral foreign body, or possibly a small displaced dental chip fracture fragment (series 4, image 127). A questionable donor site is suggested in the lingual aspect of the right 1st mandibular bicuspid tooth (series 4, image 121).    Otherwise, the maxilla, maxillary sinus walls, zygomatic arches, pterygoid plates, bones of the orbits, nasal skeleton, and remaining visualized maxillofacial and skull base bone demonstrate no acute fracture deformity.    The ocular globes, lenses, and postseptal intraorbital soft tissues demonstrate no acute abnormalities.    Allowing for some presumed developmental asymmetry, the hyoid bone demonstrates no acute fracture deformity.    On axial images, there is a left paramedian parasagittally oriented lucency projecting across the ventral apex of the thyroid cartilage.  Uncertain if this represents an acute or pre-existing finding, such as an acute or pre-existing thyroid cartilage fracture.  This might also represent a developmental variant.  There is no adjacent soft tissue swelling apparent to further suggest acute fracture.    Minimal scattered mucosal disease in the paranasal sinuses.    Several maxillary dental roots project into the maxillary sinuses bilaterally.  There remains an impacted left maxillary 3rd molar tooth is root substantially protrude into the left maxillary sinus.  The 3 other third molar/wisdom teeth are absent.    Cervical Spine CT:    Normal alignment of the cervical vertebra, with preserved cervical lordosis.    No acute cervical vertebral fracture deformity is demonstrated.  No cervical prevertebral soft tissue swelling is demonstrated.  Intraspinal soft tissue detail is not optimally visualized, especially below C5-C6.  Allowing for this limitation, multiple small multilevel posterior disc protrusions are suggested, most apparent at C3-4, C4-5, C5-6, and C6-C7.    The visualized airway is patent.   Normal sized thyroid.  Subcentimeter hypodensity in the left thyroid lobe, possibly a nodule etiology uncertain but not fully characterized but likely not clinically significant, based on its size.    Visualized portions of the upper thoracic viscera demonstrate no noncontrast CT evidence of acute injury.                                       X-Ray Chest AP Portable (Final result)  Result time 11/16/24 06:35:28      Final result by Roger Renae MD (11/16/24 06:35:28)                   Impression:      No acute radiographic abnormality.  Possible emphysema      Electronically signed by: Roger Renae  Date:    11/16/2024  Time:    06:35               Narrative:    EXAMINATION:  XR CHEST AP PORTABLE    CLINICAL HISTORY:  Syncope and collapse    TECHNIQUE:  Single frontal view of the chest was performed.    COMPARISON:  None    FINDINGS:  Midline thoracic trachea, allowing for rightward patient rotation.    Normal sized cardiomediastinal silhouette.    Lungs appear mildly hyperlucent, as may be seen with pulmonary emphysema.  Component of bronchospasm is neither confirmed nor fully excluded.    No consolidation, pulmonary edema, discrete pneumothorax, or blunting of either lateral costophrenic angle.    Bilateral punctate pulmonary opacities likely represent pulmonary vessels in axial project and/or calcified granulomas.    No acute radiographic abnormality is demonstrated in the visualized skeleton or visualized upper abdomen.  Note that osseous detail is not optimally visualized in the spine.                                       X-Ray Knee 3 View Right (Final result)  Result time 11/16/24 06:34:06      Final result by Roger Renae MD (11/16/24 06:34:06)                   Impression:      Soft tissue swelling.      Electronically signed by: Roger Renae  Date:    11/16/2024  Time:    06:34               Narrative:    EXAMINATION:  XR KNEE 3 VIEW RIGHT    CLINICAL HISTORY:  Unspecified fall, initial  encounter    TECHNIQUE:  AP, lateral, and Merchant views of the right knee were performed.    COMPARISON:  None    FINDINGS:  No acute osseous or articular abnormality.  Mild soft tissue swelling is suggested.                                       X-Ray Shoulder Trauma Left (Final result)  Result time 11/16/24 06:33:22      Final result by Roger Renae MD (11/16/24 06:33:22)                   Impression:      No acute radiographic abnormality.      Electronically signed by: Roger Renae  Date:    11/16/2024  Time:    06:33               Narrative:    EXAMINATION:  XR SHOULDER TRAUMA 3 VIEW LEFT    CLINICAL HISTORY:  Unspecified fall, initial encounter    TECHNIQUE:  Three views of the left shoulder were performed.    COMPARISON  None    FINDINGS:  No acute osseous, articular, or soft tissue abnormality is demonstrated radiographically.                                    Patient updated on his injuries.  He was given a dose of IV Unasyn  Tetanus was updated  He reports he is not in as much pain as expected and would like to make it home to [place] to get his mandible repaired.   I discussed concern with R inner ear involvement. He does not have active bleeding, pain but I do have concern about him developing those in air.   Plastic surgery was consulted for facial lacerations and patient thinks he has time prior to going to the airport.   Care was transferred to Dr. Ross with plastic surgery coming to evaluate him but ultimately if patient does leave it will be AMA.      Disposition:AMA    Patient understands risks of leaving AMA. Patient counseled regarding exam, results, diagnosis, treatment, and plan.    Impression: Final diagnoses:  [W19.XXXA] Fall  [R55] Syncope  [S09.90XA] Injury of head, initial encounter (Primary)  [S02.632A] Closed fracture of left coronoid process of mandible, initial encounter  [S02.631A] Closed fracture of right coronoid process of mandible, initial encounter  [S02.69XA]  Bilateral fracture of head of condyle and midline fracture of mandible  [S01.511A] Lip laceration, initial encounter  [S01.81XA] Chin laceration, initial encounter

## 2024-11-16 NOTE — ED PROVIDER NOTES
Encounter Date: 11/15/2024       History     Chief Complaint   Patient presents with    Mouth Injury     Fell in bathroom at home and chipped front bottom right tooth with laceration on lower lip inside /out and chin     48 y.o. Male presents to the ED s/p syncopal episode.  While in the bathroom he started to feel lightheaded and syncopized.  He is unsure how long he was out for.  He hit his head and is complaining of right-sided jaw pain, bleeding from his right ear, headache, tooth pain, chin pain.  He broke at least 2 teeth when he fell.  He also has a chin laceration.  He has some soreness of his left shoulder and right knee as well.  He states that earlier at dinner he was drinking mainly wine.  He states he did not feel great during dinner that does not have a specific complaint.  He denies visual changes, dizziness, vomiting, paresthesias, focal weakness, chest pain, shortness of breath, abdominal pain.    The history is provided by the patient.     Review of patient's allergies indicates:  No Known Allergies  History reviewed. No pertinent past medical history.  History reviewed. No pertinent surgical history.  No family history on file.  Social History     Tobacco Use    Smoking status: Never    Smokeless tobacco: Never     Review of Systems   Constitutional:  Negative for fever.   HENT:  Positive for dental problem, ear discharge, ear pain and facial swelling.    Eyes:  Negative for visual disturbance.   Respiratory:  Negative for shortness of breath.    Cardiovascular:  Negative for chest pain.   Gastrointestinal:  Negative for nausea and vomiting.   Musculoskeletal:  Positive for arthralgias and myalgias. Negative for neck pain.   Skin:  Positive for wound.   Neurological:  Positive for syncope and light-headedness. Negative for dizziness and headaches.       Physical Exam     Initial Vitals [11/15/24 2252]   BP Pulse Resp Temp SpO2   130/84 103 20 98.1 °F (36.7 °C) 96 %      MAP       --          Physical Exam    Nursing note and vitals reviewed.  Constitutional: He appears well-developed and well-nourished. He is not diaphoretic. No distress.   HENT:   Head: Normocephalic. Head is with laceration.   Right Ear: There is drainage. Tympanic membrane is perforated (blood from R TM).   Nose: Nose normal.   2 cm deep laceration to chin.  1-2 cm Full thickness laceration of lower lip. Tenderness and swelling of R TMJ joint   Eyes: Conjunctivae and EOM are normal.   Neck: Neck supple.   Cardiovascular:  Normal rate, regular rhythm, normal heart sounds and intact distal pulses.           Pulses:       Radial pulses are 2+ on the right side and 2+ on the left side.        Posterior tibial pulses are 2+ on the right side and 2+ on the left side.   Pulmonary/Chest: Breath sounds normal. No respiratory distress.   Musculoskeletal:      Left shoulder: Tenderness present. No swelling. Normal range of motion. Normal pulse.      Cervical back: Neck supple. No tenderness or bony tenderness.      Right knee: Normal range of motion. Tenderness present.      Comments: No midline C-spine, T-spine, L-spine TTP     Neurological: He is alert and oriented to person, place, and time. He has normal strength. He exhibits normal muscle tone. GCS eye subscore is 4. GCS verbal subscore is 5. GCS motor subscore is 6.   Alert.  Oriented x3.  He is responding appropriately to all questions.   Skin: No rash noted.   Psychiatric: He has a normal mood and affect. His speech is normal and behavior is normal. Judgment and thought content normal.         ED Course   Procedures  Labs Reviewed   CBC W/ AUTO DIFFERENTIAL - Abnormal       Result Value    WBC 10.12      RBC 5.08      Hemoglobin 15.6      Hematocrit 44.5      MCV 88      MCH 30.7      MCHC 35.1      RDW 11.9      Platelets 210      MPV 10.2      Immature Granulocytes 0.4      Gran # (ANC) 8.0 (*)     Immature Grans (Abs) 0.04      Lymph # 1.2      Mono # 0.8      Eos # 0.0      Baso  # 0.04      nRBC 0      Gran % 78.8 (*)     Lymph % 12.2 (*)     Mono % 7.9      Eosinophil % 0.3      Basophil % 0.4      Differential Method Automated     COMPREHENSIVE METABOLIC PANEL - Abnormal    Sodium 139      Potassium 4.0      Chloride 101      CO2 25      Glucose 121 (*)     BUN 15      Creatinine 1.0      Calcium 9.3      Total Protein 8.4      Albumin 4.7      Total Bilirubin 1.3 (*)     Alkaline Phosphatase 67      AST 75 (*)     ALT 69 (*)     eGFR >60.0      Anion Gap 13     POCT GLUCOSE - Abnormal    POCT Glucose 133 (*)    HEPATITIS C ANTIBODY    Hepatitis C Ab Non-reactive      Narrative:     Release to patient->Immediate   HIV 1 / 2 ANTIBODY    HIV 1/2 Ag/Ab Non-reactive      Narrative:     Release to patient->Immediate   TROPONIN I    Troponin I <0.006       EKG Readings: (Independently Interpreted)   Initial Reading: No STEMI. Rhythm: Normal Sinus Rhythm. Heart Rate: 97. Axis: Normal. Clinical Impression: Normal Sinus Rhythm     ECG Results              EKG 12-lead (Final result)        Collection Time Result Time QRS Duration OHS QTC Calculation    11/16/24 01:06:26 11/16/24 09:04:38 88 467                     Final result by Interface, Lab In [facility] (11/16/24 09:04:47)                   Narrative:    Test Reason : R55,    Vent. Rate :  97 BPM     Atrial Rate :  97 BPM     P-R Int : 138 ms          QRS Dur :  88 ms      QT Int : 368 ms       P-R-T Axes :  71  69  47 degrees    QTcB Int : 467 ms    Normal sinus rhythm  Normal ECG  No previous ECGs available  Confirmed by Kassidy Love ([id number]) on 11/16/2024 9:04:33 AM    Referred By: AAAREFERRAL SELF           Confirmed By: Kassidy Love                                  Imaging Results              CT Temporal Bone without contrast (Final result)  Result time 11/16/24 10:58:46      Final result by Azeem Montague DO (11/16/24 10:58:46)                   Impression:      Comminuted fracture deformity posterior roof of the right glenoid fossa  involving the anterior inferior wall of the bony EAC and attachment site of the tympanic membrane as detailed above.  There is soft tissue density material along the tympanic membrane concerning for possible hematoma and component of tympanic membrane perforation not excluded clinical correlation advised.    Comminuted fracture dislocation right mandibular condyle partially included in the field of view.      Electronically signed by: Azeem Montague DO  Date:    11/16/2024  Time:    10:58               Narrative:    EXAMINATION:  CT TEMPORAL BONE WITHOUT CONTRAST    CLINICAL HISTORY:  trauma;    TECHNIQUE:  Low dose axial images were acquired through the temporal bones and skull base without contrast administration.  Coronal and sagittal reconstructions were performed.    COMPARISON:  CT maxillofacial bone earlier today 11/16/2024    FINDINGS:  Right side: There is fracture deformity involving the ventral medial wall of the right bony EAC with slight dorsal displacement.  Displacement of approximately 2 mm best seen on image 123 series 3.  This involves the attachment site of the tympanic membrane with there is resultant shortening of the tympanic membrane with intermediate density along the tympanic membrane.  The right middle ear ossicular chain is grossly intact.  There is small material along the inferior aspect of the tympanic membrane cannot exclude component of tympanic membrane perforation.    No significant opacification right middle ear cavity or mastoid air cells    Bony coverings of the right vestibule, cochlea and semicircular canals are intact.    Redemonstration condylar comminuted fracture and dislocation right mandible condyle with gas and possible hemorrhage within the glenoid fossa.    Please see prior CT maxillofacial bones for further details    Left side: Trace opacification left lateral EAC suggestive for possible cerumen.  No significant opacification left mastoid air cells and middle ear  cavity.  Left middle ear ossicular chain is intact    Bony coverings left vestibule, cochlea and semicircular canals are intact                                        CT Cervical Spine Without Contrast (Final result)  Result time 11/16/24 04:01:36      Final result by Roger Renae MD (11/16/24 04:01:36)                   Impression:      Head CT, Maxillofacial CT, and Cervical Spine CT:    No acute intracranial hemorrhage.    Multiple mandible fractures and comminuted fracture dislocation of the right TMJ, as detailed in the body of the report.    Subtle fracture questioned in the right lateral pterygoid plate.    Possible dental chip fracture arising from the lingual aspect of the right 1st mandibular bicuspid tooth.    No acute cervical vertebral fracture deformity or subluxation.    Mild multilevel posterior cervical disc protrusions.    Additional observations as detailed in the body of the report.    These results require clinical correlation.    This report was flagged in Epic as abnormal.      Electronically signed by: Roger Renae  Date:    11/16/2024  Time:    04:01               Narrative:    EXAMINATION:  CT HEAD WITHOUT CONTRAST; CT CERVICAL SPINE WITHOUT CONTRAST; CT MAXILLOFACIAL WITHOUT CONTRAST    CLINICAL HISTORY:  Facial trauma, blunt;; Polytrauma, blunt;    TECHNIQUE:  Low dose axial images were obtained through the head.  Coronal and sagittal reformations were also performed. Contrast was not administered.    Low dose axial images were obtained through the maxillofacial bones.  Coronal and sagittal reformations were also performed. Contrast was not administered.    Low dose axial images were obtained through the cervical spine.  Coronal and sagittal reformations were also performed. Contrast was not administered.    COMPARISON:  None.    FINDINGS:  Artifacts related to beam hardening and/or motion degrade portions of these scans.    Head CT:    No depressed calvarial fracture, acute  intracranial hemorrhage, discrete acute large vascular territory brain infarct, midline shift, brain herniation, or acute pathologic extra-axial fluid collection.  There is a low-attenuation prominence of the cisterna magna, likely a developmental variant.  An arachnoid cyst or other very low-attenuation extra-axial lesion in this region is not fully excluded.    Maxillofacial CT:    Multiple mandible fractures:    - hairline fracture across the base of the left coronoid process, best seen on sagittal images (series 604, images 48-50).    - left mandible parasymphyseal, extending between the roots of the left medial and lateral mandibular incisor teeth, to the alveolar ridge, also involving the socket of the left mandibular lateral incisor tooth.    - minimally displaced fracture across the base of the right maxillary coronoid process.    - comminuted displaced fracture of the right mandibular condyle, with anterior dislocation of the condylar head from the right TMJ.    - associated dorsally displaced comminuted fracture of the posterior wall of the right TMJ, with osseous fragments encroaching upon the right external auditory canal and right middle ear cavity.    No dislocation of the left TMJ.    A minimally depressed fracture of the right lateral pterygoid plate is questioned on coronal images (series 605, image 91).    A punctate opacity projecting in the right side of the oral cavity possibly represents a pre-existing soft tissue calcification of the tongue, versus an intraoral foreign body, or possibly a small displaced dental chip fracture fragment (series 4, image 127). A questionable donor site is suggested in the lingual aspect of the right 1st mandibular bicuspid tooth (series 4, image 121).    Otherwise, the maxilla, maxillary sinus walls, zygomatic arches, pterygoid plates, bones of the orbits, nasal skeleton, and remaining visualized maxillofacial and skull base bone demonstrate no acute fracture  deformity.    The ocular globes, lenses, and postseptal intraorbital soft tissues demonstrate no acute abnormalities.    Allowing for some presumed developmental asymmetry, the hyoid bone demonstrates no acute fracture deformity.    On axial images, there is a left paramedian parasagittally oriented lucency projecting across the ventral apex of the thyroid cartilage.  Uncertain if this represents an acute or pre-existing finding, such as an acute or pre-existing thyroid cartilage fracture.  This might also represent a developmental variant.  There is no adjacent soft tissue swelling apparent to further suggest acute fracture.    Minimal scattered mucosal disease in the paranasal sinuses.    Several maxillary dental roots project into the maxillary sinuses bilaterally.  There remains an impacted left maxillary 3rd molar tooth is root substantially protrude into the left maxillary sinus.  The 3 other third molar/wisdom teeth are absent.    Cervical Spine CT:    Normal alignment of the cervical vertebra, with preserved cervical lordosis.    No acute cervical vertebral fracture deformity is demonstrated.  No cervical prevertebral soft tissue swelling is demonstrated.  Intraspinal soft tissue detail is not optimally visualized, especially below C5-C6.  Allowing for this limitation, multiple small multilevel posterior disc protrusions are suggested, most apparent at C3-4, C4-5, C5-6, and C6-C7.    The visualized airway is patent.  Normal sized thyroid.  Subcentimeter hypodensity in the left thyroid lobe, possibly a nodule etiology uncertain but not fully characterized but likely not clinically significant, based on its size.    Visualized portions of the upper thoracic viscera demonstrate no noncontrast CT evidence of acute injury.                                        CT Head Without Contrast (Final result)  Result time 11/16/24 04:01:36      Final result by Roger Renae MD (11/16/24 04:01:36)                    Impression:      Head CT, Maxillofacial CT, and Cervical Spine CT:    No acute intracranial hemorrhage.    Multiple mandible fractures and comminuted fracture dislocation of the right TMJ, as detailed in the body of the report.    Subtle fracture questioned in the right lateral pterygoid plate.    Possible dental chip fracture arising from the lingual aspect of the right 1st mandibular bicuspid tooth.    No acute cervical vertebral fracture deformity or subluxation.    Mild multilevel posterior cervical disc protrusions.    Additional observations as detailed in the body of the report.    These results require clinical correlation.    This report was flagged in Epic as abnormal.      Electronically signed by: Roger Renae  Date:    11/16/2024  Time:    04:01               Narrative:    EXAMINATION:  CT HEAD WITHOUT CONTRAST; CT CERVICAL SPINE WITHOUT CONTRAST; CT MAXILLOFACIAL WITHOUT CONTRAST    CLINICAL HISTORY:  Facial trauma, blunt;; Polytrauma, blunt;    TECHNIQUE:  Low dose axial images were obtained through the head.  Coronal and sagittal reformations were also performed. Contrast was not administered.    Low dose axial images were obtained through the maxillofacial bones.  Coronal and sagittal reformations were also performed. Contrast was not administered.    Low dose axial images were obtained through the cervical spine.  Coronal and sagittal reformations were also performed. Contrast was not administered.    COMPARISON:  None.    FINDINGS:  Artifacts related to beam hardening and/or motion degrade portions of these scans.    Head CT:    No depressed calvarial fracture, acute intracranial hemorrhage, discrete acute large vascular territory brain infarct, midline shift, brain herniation, or acute pathologic extra-axial fluid collection.  There is a low-attenuation prominence of the cisterna magna, likely a developmental variant.  An arachnoid cyst or other very low-attenuation extra-axial lesion in this  region is not fully excluded.    Maxillofacial CT:    Multiple mandible fractures:    - hairline fracture across the base of the left coronoid process, best seen on sagittal images (series 604, images 48-50).    - left mandible parasymphyseal, extending between the roots of the left medial and lateral mandibular incisor teeth, to the alveolar ridge, also involving the socket of the left mandibular lateral incisor tooth.    - minimally displaced fracture across the base of the right maxillary coronoid process.    - comminuted displaced fracture of the right mandibular condyle, with anterior dislocation of the condylar head from the right TMJ.    - associated dorsally displaced comminuted fracture of the posterior wall of the right TMJ, with osseous fragments encroaching upon the right external auditory canal and right middle ear cavity.    No dislocation of the left TMJ.    A minimally depressed fracture of the right lateral pterygoid plate is questioned on coronal images (series 605, image 91).    A punctate opacity projecting in the right side of the oral cavity possibly represents a pre-existing soft tissue calcification of the tongue, versus an intraoral foreign body, or possibly a small displaced dental chip fracture fragment (series 4, image 127). A questionable donor site is suggested in the lingual aspect of the right 1st mandibular bicuspid tooth (series 4, image 121).    Otherwise, the maxilla, maxillary sinus walls, zygomatic arches, pterygoid plates, bones of the orbits, nasal skeleton, and remaining visualized maxillofacial and skull base bone demonstrate no acute fracture deformity.    The ocular globes, lenses, and postseptal intraorbital soft tissues demonstrate no acute abnormalities.    Allowing for some presumed developmental asymmetry, the hyoid bone demonstrates no acute fracture deformity.    On axial images, there is a left paramedian parasagittally oriented lucency projecting across the  ventral apex of the thyroid cartilage.  Uncertain if this represents an acute or pre-existing finding, such as an acute or pre-existing thyroid cartilage fracture.  This might also represent a developmental variant.  There is no adjacent soft tissue swelling apparent to further suggest acute fracture.    Minimal scattered mucosal disease in the paranasal sinuses.    Several maxillary dental roots project into the maxillary sinuses bilaterally.  There remains an impacted left maxillary 3rd molar tooth is root substantially protrude into the left maxillary sinus.  The 3 other third molar/wisdom teeth are absent.    Cervical Spine CT:    Normal alignment of the cervical vertebra, with preserved cervical lordosis.    No acute cervical vertebral fracture deformity is demonstrated.  No cervical prevertebral soft tissue swelling is demonstrated.  Intraspinal soft tissue detail is not optimally visualized, especially below C5-C6.  Allowing for this limitation, multiple small multilevel posterior disc protrusions are suggested, most apparent at C3-4, C4-5, C5-6, and C6-C7.    The visualized airway is patent.  Normal sized thyroid.  Subcentimeter hypodensity in the left thyroid lobe, possibly a nodule etiology uncertain but not fully characterized but likely not clinically significant, based on its size.    Visualized portions of the upper thoracic viscera demonstrate no noncontrast CT evidence of acute injury.                                        CT Maxillofacial Without Contrast (Final result)  Result time 11/16/24 04:01:36      Final result by Roger Renae MD (11/16/24 04:01:36)                   Impression:      Head CT, Maxillofacial CT, and Cervical Spine CT:    No acute intracranial hemorrhage.    Multiple mandible fractures and comminuted fracture dislocation of the right TMJ, as detailed in the body of the report.    Subtle fracture questioned in the right lateral pterygoid plate.    Possible dental chip  fracture arising from the lingual aspect of the right 1st mandibular bicuspid tooth.    No acute cervical vertebral fracture deformity or subluxation.    Mild multilevel posterior cervical disc protrusions.    Additional observations as detailed in the body of the report.    These results require clinical correlation.    This report was flagged in Epic as abnormal.      Electronically signed by: Roger Renae  Date:    11/16/2024  Time:    04:01               Narrative:    EXAMINATION:  CT HEAD WITHOUT CONTRAST; CT CERVICAL SPINE WITHOUT CONTRAST; CT MAXILLOFACIAL WITHOUT CONTRAST    CLINICAL HISTORY:  Facial trauma, blunt;; Polytrauma, blunt;    TECHNIQUE:  Low dose axial images were obtained through the head.  Coronal and sagittal reformations were also performed. Contrast was not administered.    Low dose axial images were obtained through the maxillofacial bones.  Coronal and sagittal reformations were also performed. Contrast was not administered.    Low dose axial images were obtained through the cervical spine.  Coronal and sagittal reformations were also performed. Contrast was not administered.    COMPARISON:  None.    FINDINGS:  Artifacts related to beam hardening and/or motion degrade portions of these scans.    Head CT:    No depressed calvarial fracture, acute intracranial hemorrhage, discrete acute large vascular territory brain infarct, midline shift, brain herniation, or acute pathologic extra-axial fluid collection.  There is a low-attenuation prominence of the cisterna magna, likely a developmental variant.  An arachnoid cyst or other very low-attenuation extra-axial lesion in this region is not fully excluded.    Maxillofacial CT:    Multiple mandible fractures:    - hairline fracture across the base of the left coronoid process, best seen on sagittal images (series 604, images 48-50).    - left mandible parasymphyseal, extending between the roots of the left medial and lateral mandibular incisor  teeth, to the alveolar ridge, also involving the socket of the left mandibular lateral incisor tooth.    - minimally displaced fracture across the base of the right maxillary coronoid process.    - comminuted displaced fracture of the right mandibular condyle, with anterior dislocation of the condylar head from the right TMJ.    - associated dorsally displaced comminuted fracture of the posterior wall of the right TMJ, with osseous fragments encroaching upon the right external auditory canal and right middle ear cavity.    No dislocation of the left TMJ.    A minimally depressed fracture of the right lateral pterygoid plate is questioned on coronal images (series 605, image 91).    A punctate opacity projecting in the right side of the oral cavity possibly represents a pre-existing soft tissue calcification of the tongue, versus an intraoral foreign body, or possibly a small displaced dental chip fracture fragment (series 4, image 127). A questionable donor site is suggested in the lingual aspect of the right 1st mandibular bicuspid tooth (series 4, image 121).    Otherwise, the maxilla, maxillary sinus walls, zygomatic arches, pterygoid plates, bones of the orbits, nasal skeleton, and remaining visualized maxillofacial and skull base bone demonstrate no acute fracture deformity.    The ocular globes, lenses, and postseptal intraorbital soft tissues demonstrate no acute abnormalities.    Allowing for some presumed developmental asymmetry, the hyoid bone demonstrates no acute fracture deformity.    On axial images, there is a left paramedian parasagittally oriented lucency projecting across the ventral apex of the thyroid cartilage.  Uncertain if this represents an acute or pre-existing finding, such as an acute or pre-existing thyroid cartilage fracture.  This might also represent a developmental variant.  There is no adjacent soft tissue swelling apparent to further suggest acute fracture.    Minimal scattered  mucosal disease in the paranasal sinuses.    Several maxillary dental roots project into the maxillary sinuses bilaterally.  There remains an impacted left maxillary 3rd molar tooth is root substantially protrude into the left maxillary sinus.  The 3 other third molar/wisdom teeth are absent.    Cervical Spine CT:    Normal alignment of the cervical vertebra, with preserved cervical lordosis.    No acute cervical vertebral fracture deformity is demonstrated.  No cervical prevertebral soft tissue swelling is demonstrated.  Intraspinal soft tissue detail is not optimally visualized, especially below C5-C6.  Allowing for this limitation, multiple small multilevel posterior disc protrusions are suggested, most apparent at C3-4, C4-5, C5-6, and C6-C7.    The visualized airway is patent.  Normal sized thyroid.  Subcentimeter hypodensity in the left thyroid lobe, possibly a nodule etiology uncertain but not fully characterized but likely not clinically significant, based on its size.    Visualized portions of the upper thoracic viscera demonstrate no noncontrast CT evidence of acute injury.                                       X-Ray Chest AP Portable (Final result)  Result time 11/16/24 06:35:28      Final result by Roger Renae MD (11/16/24 06:35:28)                   Impression:      No acute radiographic abnormality.  Possible emphysema      Electronically signed by: Roger Renae  Date:    11/16/2024  Time:    06:35               Narrative:    EXAMINATION:  XR CHEST AP PORTABLE    CLINICAL HISTORY:  Syncope and collapse    TECHNIQUE:  Single frontal view of the chest was performed.    COMPARISON:  None    FINDINGS:  Midline thoracic trachea, allowing for rightward patient rotation.    Normal sized cardiomediastinal silhouette.    Lungs appear mildly hyperlucent, as may be seen with pulmonary emphysema.  Component of bronchospasm is neither confirmed nor fully excluded.    No consolidation, pulmonary edema,  discrete pneumothorax, or blunting of either lateral costophrenic angle.    Bilateral punctate pulmonary opacities likely represent pulmonary vessels in axial project and/or calcified granulomas.    No acute radiographic abnormality is demonstrated in the visualized skeleton or visualized upper abdomen.  Note that osseous detail is not optimally visualized in the spine.                                       X-Ray Knee 3 View Right (Final result)  Result time 11/16/24 06:34:06      Final result by Roger Renae MD (11/16/24 06:34:06)                   Impression:      Soft tissue swelling.      Electronically signed by: Roger Renae  Date:    11/16/2024  Time:    06:34               Narrative:    EXAMINATION:  XR KNEE 3 VIEW RIGHT    CLINICAL HISTORY:  Unspecified fall, initial encounter    TECHNIQUE:  AP, lateral, and Merchant views of the right knee were performed.    COMPARISON:  None    FINDINGS:  No acute osseous or articular abnormality.  Mild soft tissue swelling is suggested.                                       X-Ray Shoulder Trauma Left (Final result)  Result time 11/16/24 06:33:22      Final result by Roger Renae MD (11/16/24 06:33:22)                   Impression:      No acute radiographic abnormality.      Electronically signed by: Roger Renae  Date:    11/16/2024  Time:    06:33               Narrative:    EXAMINATION:  XR SHOULDER TRAUMA 3 VIEW LEFT    CLINICAL HISTORY:  Unspecified fall, initial encounter    TECHNIQUE:  Three views of the left shoulder were performed.    COMPARISON  None    FINDINGS:  No acute osseous, articular, or soft tissue abnormality is demonstrated radiographically.                                       Medications   acetaminophen tablet 1,000 mg (1,000 mg Oral Given 11/16/24 0148)   Tdap (BOOSTRIX) vaccine injection 0.5 mL (0.5 mLs Intramuscular Given 11/16/24 0116)   ampicillin-sulbactam (UNASYN) 3 g in 0.9% NaCl 100 mL IVPB (MB+) (0 g Intravenous Stopped  11/16/24 0557)   LIDOcaine (PF) 10 mg/ml (1%) injection 100 mg (100 mg Infiltration Given 11/16/24 0745)   ibuprofen tablet 600 mg (600 mg Oral Given 11/16/24 0745)     Medical Decision Making  48 y.o. Male presents to the ED s/p syncopal episode. Nontoxic appearing.  Vitals with mild tachycardia.  Afebrile. Exam as above. I will initiate workup and reassess.    Ddx:  Vasovagal syncope, arrhythmia, ACS, dehydration, electrolyte derangement, mechanical fall, orthostatic hypotension, mandibular fracture, ICH, Skull fracture, TM trauma    Laboratory workup unremarkable for cause of syncope. Pending CT imaging at this time. I will signout to ED provider due to shift change.       Amount and/or Complexity of Data Reviewed  Labs: ordered. Decision-making details documented in ED Course.  Radiology: ordered.  ECG/medicine tests:  Decision-making details documented in ED Course.    Risk  OTC drugs.  Prescription drug management.               ED Course as of 11/16/24 1510   Sat Nov 16, 2024   0124 EKG 12-lead  Independently interpreted by me:   Normal sinus rhythm. Ventricular rate 97 bpm.  Normal axis.  Normal QRS duration and QT interval.  No ST segment elevation or depression.  Normal T-wave morphology. Overall impression:  Normal EKG. [LP]   1504 POCT Glucose(!): 133 [HM]   1505 Troponin I: <0.006 [HM]   1505 Sodium: 139 [HM]   1505 Potassium: 4.0 [HM]   1505 BILIRUBIN TOTAL(!): 1.3 [HM]   1505 AST(!): 75 [HM]   1505 ALT(!): 69 [HM]   1505 WBC: 10.12 [HM]   1505 Hemoglobin: 15.6 [HM]   1505 Hematocrit: 44.5 [HM]      ED Course User Index  [HM] Sofia Munson PA-C  [LP] Eduard Hutchinson III, MD                           Clinical Impression:  Final diagnoses:  [W19.XXXA] Fall  [R55] Syncope  [S09.90XA] Injury of head, initial encounter (Primary)  [S02.632A] Closed fracture of left coronoid process of mandible, initial encounter  [S02.631A] Closed fracture of right coronoid process of mandible, initial  encounter  [S02.69XA] Bilateral fracture of head of condyle and midline fracture of mandible  [S01.511A] Lip laceration, initial encounter  [S01.81XA] Chin laceration, initial encounter          ED Disposition Condition    AMA Stable                Sofia Munson PA-C  11/16/24 9041

## 2024-11-16 NOTE — CONSULTS
[facility]  Otorhinolaryngology-Head & Neck Surgery  Consult Note    Patient Name: Ted Huynh  MRN: [id number]  Code Status: No Order  Admission Date: 11/16/2024  Hospital Length of Stay: 0 days  Attending Physician: No att. providers found  Primary Care Provider: No primary care provider on file.    Patient information was obtained from patient and ER records.     Inpatient consult to ENT  Consult performed by: Vitor Roy MD  Consult ordered by: Kimberly Ross MD        Subjective:     Chief Complaint/Reason for Admission: facial fracture    History of Present Illness: 48M presents after syncopal episode and fall earlier today, +LOC. Reports ringing in his R ear, muffled hearing, and R jaw pain. Also reports dental issue. Has undergoing lac repair on the chin per PRS. Denies hx of synocpe. Reports he is visiting from [place] and planning to fly back today. Reports blood in r ear canal.     Medications:  Continuous Infusions:  Scheduled Meds:   bacitracin zinc   Topical (Top) BID    LIDOcaine (PF) 10 mg/ml (1%)  10 mL Infiltration ED 1 Time    morphine  4 mg Intravenous ED 1 Time    ondansetron  4 mg Intravenous ED 1 Time    oxyCODONE  5 mg Oral ED 1 Time     PRN Meds:     No current facility-administered medications on file prior to encounter.     No current outpatient medications on file prior to encounter.       Review of patient's allergies indicates:  No Known Allergies    History reviewed. No pertinent past medical history.  History reviewed. No pertinent surgical history.  Family History    None       Tobacco Use    Smoking status: Never    Smokeless tobacco: Never   Substance and Sexual Activity    Alcohol use: Not on file    Drug use: Not on file    Sexual activity: Not on file     Review of Systems   Constitutional:  Negative for chills, fever and unexpected weight change.   HENT:  Positive for dental problem, ear discharge, ear pain, facial swelling and hearing loss. Negative for  congestion, nosebleeds, sore throat, trouble swallowing and voice change.    Eyes:  Negative for pain and visual disturbance.   Respiratory:  Negative for cough, shortness of breath and stridor.    Cardiovascular:  Negative for chest pain.   Gastrointestinal:  Negative for abdominal pain, nausea and vomiting.   Skin:  Negative for rash and wound.     Objective:     Vital Signs (Most Recent):  Temp: 98.4 °F (36.9 °C) (11/16/24 0855)  Pulse: (!) 55 (11/16/24 0855)  Resp: 16 (11/16/24 0855)  BP: (!) 163/109 (11/16/24 0855)  SpO2: 98 % (11/16/24 0855) Vital Signs (24h Range):  Temp:  [98.1 °F (36.7 °C)-98.5 °F (36.9 °C)] 98.4 °F (36.9 °C)  Pulse:  [] 55  Resp:  [16-20] 16  SpO2:  [96 %-98 %] 98 %  BP: (130-163)/() 163/109     Weight: 74.4 kg (164 lb)  Body mass index is 24.94 kg/m².         Physical Exam  Vitals reviewed.   Constitutional:       General: He is not in acute distress.     Appearance: Normal appearance. He is normal weight. He is not ill-appearing.   HENT:      Head: Normocephalic.      Jaw: Tenderness, swelling (Over R TMJ, w tenderness) and malocclusion present. No trismus.      Salivary Glands: Right salivary gland is not diffusely enlarged or tender. Left salivary gland is not diffusely enlarged or tender.      Right Ear: Hearing normal. No decreased hearing noted. No drainage.      Left Ear: Hearing and external ear normal. No decreased hearing noted. No drainage.      Ears:      Comments: Lac noted in the medial EAC, + fresh blood, not circumferential. No obvious TM perf, RAY, or hemotympanum appreciated, though exam limited due to blood in EAC  Jensen lateralizes to the R  Rinne, AC>BC bilat  No nystagmus  Hears well at conversational vol     Nose: Nose normal. No nasal deformity, signs of injury, congestion or rhinorrhea.      Right Nostril: No epistaxis.      Left Nostril: No epistaxis.      Mouth/Throat:      Lips: Pink. No lesions.      Mouth: Mucous membranes are moist. No oral  lesions or angioedema.      Dentition: No gum lesions.      Tongue: No lesions. Tongue does not deviate from midline.      Palate: No mass and lesions.      Pharynx: Oropharynx is clear. Uvula midline. No pharyngeal swelling or posterior oropharyngeal erythema.      Tonsils: No tonsillar exudate or tonsillar abscesses.   Eyes:      General: Lids are normal.         Right eye: No discharge.         Left eye: No discharge.      Extraocular Movements: Extraocular movements intact.      Conjunctiva/sclera: Conjunctivae normal.      Pupils: Pupils are equal, round, and reactive to light.   Neck:      Thyroid: No thyroid mass or thyromegaly.      Trachea: Trachea and phonation normal.   Cardiovascular:      Rate and Rhythm: Normal rate.   Pulmonary:      Effort: Pulmonary effort is normal. No accessory muscle usage, respiratory distress or retractions.      Breath sounds: No stridor.   Musculoskeletal:      Cervical back: Normal range of motion and neck supple. No muscular tenderness.   Lymphadenopathy:      Cervical: No cervical adenopathy.   Neurological:      Mental Status: He is alert and oriented to person, place, and time.      Cranial Nerves: No facial asymmetry.          Significant Labs:  BMP:   Recent Labs   Lab 11/16/24  0100   *      CO2 25   BUN 15   CREATININE 1.0   CALCIUM 9.3     CBC:   Recent Labs   Lab 11/16/24  0100   WBC 10.12   RBC 5.08   HGB 15.6   HCT 44.5      MCV 88   MCH 30.7   MCHC 35.1       Significant Diagnostics:  CT: I have reviewed all pertinent results/findings within the past 24 hours and my personal findings are:  Comminuted fracture of the superior ramus and condylar process of the R mandible with dislocation of the R TMJ and fracture of the anterior EAC. No appreciated middle ear fluid or fracture through the otic capsule. Official read pending.     Assessment/Plan:     * Closed fracture of right condylar process of mandible  48M w condylar fracture of the R  mandible, comminuted, w TMJ dislocation, minimal trismus and malocclusion, with resulting laceration of the R EAC.     - Recommend transfer to facility with OMFS for TMJ eval. Patient has expressed that he wishes to return home to [place] today to be evaluated by dental/OMFS closer to home, which is reasonable.  - Recommend ofloxin gtt, 4 gtt R ear BID given ear lac  - Recocommend no chew diet  - Recommend pain meds PRN  - Please provide copies of patient's CT scans so he can be evaluated by surgeon in [place]  - Please page w questions or concerns    Case discussed w Dr. Molina      VTE Risk Mitigation (From admission, onward)      None            Thank you for your consult. I will sign off. Please contact us if you have any additional questions.    Vitor Roy MD  Otorhinolaryngology-Head & Neck Surgery  [facility]

## 2024-11-16 NOTE — ASSESSMENT & PLAN NOTE
48M w condylar fracture of the R mandible, comminuted, w TMJ dislocation, minimal trismus and malocclusion, with resulting laceration of the R EAC.     - Recommend transfer to facility with OMFS for TMJ eval. Patient has expressed that he wishes to return home to [place] today to be evaluated by dental/OMFS closer to home, which is reasonable.  - Recommend ofloxin gtt, 4 gtt R ear BID given ear lac  - Recocommend no chew diet  - Recommend pain meds PRN  - Please provide copies of patient's CT scans so he can be evaluated by surgeon in [place]  - Please page w questions or concerns    Case discussed w Dr. Molina

## 2024-11-16 NOTE — SUBJECTIVE & OBJECTIVE
Medications:  Continuous Infusions:  Scheduled Meds:   bacitracin zinc   Topical (Top) BID    LIDOcaine (PF) 10 mg/ml (1%)  10 mL Infiltration ED 1 Time    morphine  4 mg Intravenous ED 1 Time    ondansetron  4 mg Intravenous ED 1 Time    oxyCODONE  5 mg Oral ED 1 Time     PRN Meds:     No current facility-administered medications on file prior to encounter.     No current outpatient medications on file prior to encounter.       Review of patient's allergies indicates:  No Known Allergies    History reviewed. No pertinent past medical history.  History reviewed. No pertinent surgical history.  Family History    None       Tobacco Use    Smoking status: Never    Smokeless tobacco: Never   Substance and Sexual Activity    Alcohol use: Not on file    Drug use: Not on file    Sexual activity: Not on file     Review of Systems   Constitutional:  Negative for chills, fever and unexpected weight change.   HENT:  Positive for dental problem, ear discharge, ear pain, facial swelling and hearing loss. Negative for congestion, nosebleeds, sore throat, trouble swallowing and voice change.    Eyes:  Negative for pain and visual disturbance.   Respiratory:  Negative for cough, shortness of breath and stridor.    Cardiovascular:  Negative for chest pain.   Gastrointestinal:  Negative for abdominal pain, nausea and vomiting.   Skin:  Negative for rash and wound.     Objective:     Vital Signs (Most Recent):  Temp: 98.4 °F (36.9 °C) (11/16/24 0855)  Pulse: (!) 55 (11/16/24 0855)  Resp: 16 (11/16/24 0855)  BP: (!) 163/109 (11/16/24 0855)  SpO2: 98 % (11/16/24 0855) Vital Signs (24h Range):  Temp:  [98.1 °F (36.7 °C)-98.5 °F (36.9 °C)] 98.4 °F (36.9 °C)  Pulse:  [] 55  Resp:  [16-20] 16  SpO2:  [96 %-98 %] 98 %  BP: (130-163)/() 163/109     Weight: 74.4 kg (164 lb)  Body mass index is 24.94 kg/m².         Physical Exam  Vitals reviewed.   Constitutional:       General: He is not in acute distress.     Appearance: Normal  appearance. He is normal weight. He is not ill-appearing.   HENT:      Head: Normocephalic.      Jaw: Tenderness, swelling (Over R TMJ, w tenderness) and malocclusion present. No trismus.      Salivary Glands: Right salivary gland is not diffusely enlarged or tender. Left salivary gland is not diffusely enlarged or tender.      Right Ear: Hearing normal. No decreased hearing noted. No drainage.      Left Ear: Hearing and external ear normal. No decreased hearing noted. No drainage.      Ears:      Comments: Lac noted in the medial EAC, + fresh blood, not circumferential. No obvious TM perf, RAY, or hemotympanum appreciated, though exam limited due to blood in EAC  Jensen lateralizes to the R  Rinne, AC>BC bilat  No nystagmus  Hears well at conversational vol     Nose: Nose normal. No nasal deformity, signs of injury, congestion or rhinorrhea.      Right Nostril: No epistaxis.      Left Nostril: No epistaxis.      Mouth/Throat:      Lips: Pink. No lesions.      Mouth: Mucous membranes are moist. No oral lesions or angioedema.      Dentition: No gum lesions.      Tongue: No lesions. Tongue does not deviate from midline.      Palate: No mass and lesions.      Pharynx: Oropharynx is clear. Uvula midline. No pharyngeal swelling or posterior oropharyngeal erythema.      Tonsils: No tonsillar exudate or tonsillar abscesses.   Eyes:      General: Lids are normal.         Right eye: No discharge.         Left eye: No discharge.      Extraocular Movements: Extraocular movements intact.      Conjunctiva/sclera: Conjunctivae normal.      Pupils: Pupils are equal, round, and reactive to light.   Neck:      Thyroid: No thyroid mass or thyromegaly.      Trachea: Trachea and phonation normal.   Cardiovascular:      Rate and Rhythm: Normal rate.   Pulmonary:      Effort: Pulmonary effort is normal. No accessory muscle usage, respiratory distress or retractions.      Breath sounds: No stridor.   Musculoskeletal:      Cervical back:  Normal range of motion and neck supple. No muscular tenderness.   Lymphadenopathy:      Cervical: No cervical adenopathy.   Neurological:      Mental Status: He is alert and oriented to person, place, and time.      Cranial Nerves: No facial asymmetry.          Significant Labs:  BMP:   Recent Labs   Lab 11/16/24  0100   *      CO2 25   BUN 15   CREATININE 1.0   CALCIUM 9.3     CBC:   Recent Labs   Lab 11/16/24  0100   WBC 10.12   RBC 5.08   HGB 15.6   HCT 44.5      MCV 88   MCH 30.7   MCHC 35.1       Significant Diagnostics:  CT: I have reviewed all pertinent results/findings within the past 24 hours and my personal findings are:  Comminuted fracture of the superior ramus and condylar process of the R mandible with dislocation of the R TMJ and fracture of the anterior EAC. No appreciated middle ear fluid or fracture through the otic capsule. Official read pending.

## 2024-11-16 NOTE — CONSULTS
".Plastic and Reconstructive Surgery   Consult Note    Date of Consultation:   2024    Reason for Consultation:  Facial trauma    History of Present Illness:  48yoM w/ no PMI who presents today for evaluation after fall last night. + ETOH. + LOC. Had laceration to face as well as jaw swelling reported. On trauma evaluation, has malocclusion. Additionally noted hemotympanum and tinnnitus on right ear. On CT, has complex bilateral condyle mandibular fracture with parasympheaseal component as well as suspected temporal bone fx.     Past Medical History:    has no past medical history on file.    Past Surgical History:    has no past surgical history on file.    Social History:  Social History     Tobacco Use    Smoking status: Never    Smokeless tobacco: Never   Substance Use Topics    Alcohol use: Not on file     Social History     Substance and Sexual Activity   Drug Use Not on file       Family History:  No family history on file.    Allergies:  Review of patient's allergies indicates:  No Known Allergies    Home Medications:  Prior to Admission medications    Medication Sig Start Date End Date Taking? Authorizing Provider   amoxicillin-clavulanate 875-125mg (AUGMENTIN) 875-125 mg per tablet Take 1 tablet by mouth 2 (two) times daily. 24   Kimberly Ross MD       Review of Systems:  Negative except for what is noted in HPI    Physical Exam:  VITAL SIGNS:   Vitals:    11/15/24 2252 24 0443 24 0741 24 0855   BP: 130/84 137/89 (!) 141/93 (!) 163/109   BP Location: Right arm Left arm     Patient Position:  Sitting     Pulse: 103 (!) 116 (!) 119 (!) 55   Resp:  16   Temp: 98.1 °F (36.7 °C) 98.5 °F (36.9 °C) 98.5 °F (36.9 °C) 98.4 °F (36.9 °C)   TempSrc: Axillary Oral Oral Oral   SpO2: 96% 97% 98% 98%   Weight: 74.4 kg (164 lb)      Height: 5' 8" (1.727 m)        TMAX: Temp (24hrs), Av.4 °F (36.9 °C), Min:98.1 °F (36.7 °C), Max:98.5 °F (36.9 °C)    General: Alert; No acute " "distress  Cardiovascular: Regular rate   Respiratory: Normal respiratory effort. Chest rise symmetric.   Abdomen: Soft, nontender, nondistended  Extremity: Moves all extremities equally.  Neurologic: No focal deficit. Speech normal  FACE: malocclusion. Complete full thickness lower lip laceration 3-4cm. TMJ crepitus. Chin laceartion 3cm           Diagnostic Data:  Recent Results (from the past 2 weeks)   CBC auto differential    Collection Time: 11/16/24  1:00 AM   Result Value Ref Range    WBC 10.12 3.90 - 12.70 K/uL    Hemoglobin 15.6 14.0 - 18.0 g/dL    Hematocrit 44.5 40.0 - 54.0 %    Platelets 210 150 - 450 K/uL     No results found for this or any previous visit (from the past 2 weeks).  Lab Results   Component Value Date    ALBUMIN 4.7 11/16/2024     No results found for: "CRP"  No results found for: "INR", "PROTIME"  No results found for: "PTT"         Head CT, Maxillofacial CT, and Cervical Spine CT:     No acute intracranial hemorrhage.     Multiple mandible fractures and comminuted fracture dislocation of the right TMJ, as detailed in the body of the report.     Subtle fracture questioned in the right lateral pterygoid plate.     Possible dental chip fracture arising from the lingual aspect of the right 1st mandibular bicuspid tooth.     No acute cervical vertebral fracture deformity or subluxation.     Mild multilevel posterior cervical disc protrusions.     Additional observations as detailed in the body of the report.     These results require clinical correlation.            Assessment:  48 y.o.male w/ facial trauma  - chin laceration 3 cm  - lower lip full thickness laceration 4cm including mucosal component  - bilateral condyle mandibular fx with parasympheaseal component and comminution of TMJ with malocclusion  - hemotympanum with tinnitus and concern for EAC and IAC involvement     Plan:  - repair facial laceration under local  - recommend operative fixation of mandible fx. Pt is traveling from " [place] and plans to fly back today. He is declining surgical intervention that I have recommended in order to have surgery in [facility]. I have explained the risks of delaying surgery, potential worsening of fracture displacement. He understands all the risks and is still declining surgical intervention at this time. He has expressed to ED is he willing to sign AMA paperwork.  - Strongly recommend ENT evaluation for tinnitus and hemotympanum  - will be available if pt decides to stay and proceed with care here.    The patient was discussed with attending surgeon, MD Juancarlos Bill/[facility]

## 2024-11-16 NOTE — ED TRIAGE NOTES
Ted Huynh, a 48 y.o. male presents to the ED w/ complaint of headache and lacerations to bottom lip and chin post mechanical trip and fall. Pt reports hitting head, +LOC, -blood thinners, +ETOH. Pt also reports bleeding from right ear but denies hearing changes or ringing. Pt denies CP or SOB.     Triage note:  Chief Complaint   Patient presents with    Mouth Injury     Fell in bathroom at home and chipped front bottom right tooth with laceration on lower lip inside /out and chin     Review of patient's allergies indicates:  No Known Allergies  History reviewed. No pertinent past medical history.

## 2024-11-16 NOTE — PROVIDER PROGRESS NOTES - EMERGENCY DEPT.
Encounter Date: 11/15/2024    ED Physician Progress Notes          ED staff SIGN OUT NOTE    Patient s/o to me by Dr. Rosas pending plastics evaluation     Patient was evaluated by Plastic surgery, lacerations were irrigated repaired    During repair process, the patient was noted to have evidence of tinnitus which was new since his presentation as well as hemotympanum on the right.  Given the evidence of fracture fragments that are compressing the external auditory canal, patient was found by ENT who recommends ofloxacin otic solution.    The patient was offered transfer to [facility] for evaluation of fractures however, the patient decided to leave against medical advice and follow up with oral maxillofacial surgery, ENT at home in [place].     Patient received Augmentin p.o., ofloxacin otic for discharge and was discharged with instructions to seek medical care immediately upon arriving home in [place].

## 2024-11-16 NOTE — HPI
48M presents after syncopal episode and fall earlier today, +LOC. Reports ringing in his R ear, muffled hearing, and R jaw pain. Also reports dental issue. Has undergoing lac repair on the chin per PRS. Denies hx of synocpe. Reports he is visiting from [place] and planning to fly back today. Reports blood in r ear canal.

## 2024-11-16 NOTE — PROCEDURES
.Laceration Repair Procedure Note    Pre-operative Diagnosis: facial laceration 3cm chin and 4cm lower lip    Post-operative Diagnosis: same    Indications: close wounds    Anesthesia: 1% plain lidocaine    Procedure Details   A procedural consent was verbalized by the patient and/or parent. A standard timeout was performed with all parties in the room.  10 cc of local anesthetic was infiltrated in a local field block  The wound bed was evaluated and full thickness around lip and partial thickness along chin for some components. The wound was copiously irrigated and all debris was removed. The wound was closed in the following layers: 3-0 chromic mucosal and external lip sutures. 4-0 simple prolene sutures along chin.  The laceration repair was dressed with bacitracin and guaze/tape dressed. The procedure was concluded, all instruments/needles accounted for. The patient tolerated the procedure well.       Findings:  Wound closed with absorbable and nonabsorble suture    EBL: 5 cc's    Drains: none    Condition:  Stable    Complications:  none.    Wound Care: Daily dressing changes with bacitracin and guaze    Instructions:  Remove prolene in 1 week    Disposition:  Clear for discharge. Follow up with surgeon or PCP in [facility]    MD Juancarlos Mandel/[facility]

## 2024-11-16 NOTE — DISCHARGE INSTRUCTIONS
I recommend that we transfer you to [facility] for emergent evaluation by an oral maxillofacial surgeon. You have multiple fractures of your jaw. You will require surgery.    We recommend that you do not eat any foods that you need to chew until you are seen by an oral maxillofacial surgeon.  This is typically foods that are soft such as mashed potato consistency.    Please seek care emergently somewhere an oral surgeon is available.  You were given a one time dose of IV Unasyn (an antibiotic) and had your tetanus updated.    You were prescribed in antibiotic called Augmentin to prevent infection.  We recommend you take it twice daily for 1 week.    Our ear nose and throat physicians also recommend you use ofloxacin ear drops, 4 drops in your right ear twice daily to prevent infection    Please be sure to seek medical attention immediately upon your arrival home in [place].  Specifically, an oral maxillofacial surgeon as well as an ear nose and throat specialist.

## 2024-11-22 ENCOUNTER — APPOINTMENT (OUTPATIENT)
Dept: CARDIOLOGY | Facility: CLINIC | Age: 49
End: 2024-11-22

## 2024-12-18 ENCOUNTER — APPOINTMENT (OUTPATIENT)
Dept: OTOLARYNGOLOGY | Facility: CLINIC | Age: 49
End: 2024-12-18
Payer: COMMERCIAL

## 2024-12-18 DIAGNOSIS — S01.319S LACERATION W/OUT FOREIGN BODY OF UNSPECIFIED EAR, SEQUELA: ICD-10-CM

## 2024-12-18 DIAGNOSIS — H61.21 IMPACTED CERUMEN, RIGHT EAR: ICD-10-CM

## 2024-12-18 DIAGNOSIS — H92.20 OTORRHAGIA, UNSPECIFIED EAR: ICD-10-CM

## 2024-12-18 DIAGNOSIS — R73.03 PREDIABETES.: ICD-10-CM

## 2024-12-18 PROCEDURE — 69210 REMOVE IMPACTED EAR WAX UNI: CPT | Mod: RT

## 2024-12-18 PROCEDURE — 99213 OFFICE O/P EST LOW 20 MIN: CPT | Mod: 25

## 2024-12-23 ENCOUNTER — APPOINTMENT (OUTPATIENT)
Dept: OTOLARYNGOLOGY | Facility: CLINIC | Age: 49
End: 2024-12-23
Payer: COMMERCIAL

## 2024-12-23 VITALS — BODY MASS INDEX: 23.57 KG/M2 | WEIGHT: 155 LBS

## 2024-12-23 DIAGNOSIS — M26.609 UNSPECIFIED TEMPOROMANDIBULAR JOINT DISORDER: ICD-10-CM

## 2024-12-23 DIAGNOSIS — Z01.10 ENCOUNTER FOR EXAMINATION OF EARS AND HEARING W/OUT ABNORMAL FINDINGS: ICD-10-CM

## 2024-12-23 DIAGNOSIS — H92.09 OTALGIA, UNSPECIFIED EAR: ICD-10-CM

## 2024-12-23 PROCEDURE — 99213 OFFICE O/P EST LOW 20 MIN: CPT | Mod: 25

## 2024-12-23 PROCEDURE — 92557 COMPREHENSIVE HEARING TEST: CPT

## 2024-12-23 PROCEDURE — 92567 TYMPANOMETRY: CPT

## 2025-01-10 ENCOUNTER — APPOINTMENT (OUTPATIENT)
Dept: OTOLARYNGOLOGY | Facility: CLINIC | Age: 50
End: 2025-01-10

## 2025-02-12 ENCOUNTER — APPOINTMENT (OUTPATIENT)
Dept: CARDIOLOGY | Facility: CLINIC | Age: 50
End: 2025-02-12

## 2025-02-21 ENCOUNTER — OUTPATIENT (OUTPATIENT)
Dept: OUTPATIENT SERVICES | Facility: HOSPITAL | Age: 50
LOS: 1 days | End: 2025-02-21
Payer: COMMERCIAL

## 2025-02-21 ENCOUNTER — RESULT REVIEW (OUTPATIENT)
Age: 50
End: 2025-02-21

## 2025-02-21 ENCOUNTER — APPOINTMENT (OUTPATIENT)
Dept: CV DIAGNOSTICS | Facility: HOSPITAL | Age: 50
End: 2025-02-21

## 2025-02-21 DIAGNOSIS — R94.31 ABNORMAL ELECTROCARDIOGRAM [ECG] [EKG]: ICD-10-CM

## 2025-02-21 DIAGNOSIS — R55 SYNCOPE AND COLLAPSE: ICD-10-CM

## 2025-02-21 PROCEDURE — 93016 CV STRESS TEST SUPVJ ONLY: CPT | Mod: GC

## 2025-02-21 PROCEDURE — 93018 CV STRESS TEST I&R ONLY: CPT | Mod: GC

## 2025-02-21 PROCEDURE — 78451 HT MUSCLE IMAGE SPECT SING: CPT | Mod: 26

## 2025-03-03 ENCOUNTER — APPOINTMENT (OUTPATIENT)
Dept: INTERNAL MEDICINE | Facility: CLINIC | Age: 50
End: 2025-03-03
Payer: COMMERCIAL

## 2025-03-03 VITALS
SYSTOLIC BLOOD PRESSURE: 118 MMHG | HEART RATE: 81 BPM | HEIGHT: 68 IN | DIASTOLIC BLOOD PRESSURE: 70 MMHG | BODY MASS INDEX: 24.86 KG/M2 | OXYGEN SATURATION: 98 % | TEMPERATURE: 98 F | WEIGHT: 164 LBS

## 2025-03-03 DIAGNOSIS — R73.03 PREDIABETES.: ICD-10-CM

## 2025-03-03 DIAGNOSIS — E78.00 PURE HYPERCHOLESTEROLEMIA, UNSPECIFIED: ICD-10-CM

## 2025-03-03 DIAGNOSIS — I10 ESSENTIAL (PRIMARY) HYPERTENSION: ICD-10-CM

## 2025-03-03 PROCEDURE — 36415 COLL VENOUS BLD VENIPUNCTURE: CPT

## 2025-03-03 PROCEDURE — 99214 OFFICE O/P EST MOD 30 MIN: CPT

## 2025-03-03 PROCEDURE — G2211 COMPLEX E/M VISIT ADD ON: CPT | Mod: NC

## 2025-03-05 LAB
ALBUMIN SERPL ELPH-MCNC: 4.8 G/DL
ALP BLD-CCNC: 74 U/L
ALT SERPL-CCNC: 50 U/L
ANION GAP SERPL CALC-SCNC: 14 MMOL/L
AST SERPL-CCNC: 38 U/L
BASOPHILS # BLD AUTO: 0.05 K/UL
BASOPHILS NFR BLD AUTO: 0.9 %
BILIRUB SERPL-MCNC: 0.7 MG/DL
BUN SERPL-MCNC: 19 MG/DL
CALCIUM SERPL-MCNC: 9.8 MG/DL
CHLORIDE SERPL-SCNC: 103 MMOL/L
CHOLEST SERPL-MCNC: 127 MG/DL
CO2 SERPL-SCNC: 25 MMOL/L
CREAT SERPL-MCNC: 0.86 MG/DL
EGFRCR SERPLBLD CKD-EPI 2021: 107 ML/MIN/1.73M2
EOSINOPHIL # BLD AUTO: 0.16 K/UL
EOSINOPHIL NFR BLD AUTO: 2.8 %
ESTIMATED AVERAGE GLUCOSE: 128 MG/DL
GLUCOSE SERPL-MCNC: 127 MG/DL
HBA1C MFR BLD HPLC: 6.1 %
HCT VFR BLD CALC: 46 %
HDLC SERPL-MCNC: 73 MG/DL
HGB BLD-MCNC: 15.4 G/DL
IMM GRANULOCYTES NFR BLD AUTO: 0.2 %
LDLC SERPL CALC-MCNC: 38 MG/DL
LYMPHOCYTES # BLD AUTO: 1.88 K/UL
LYMPHOCYTES NFR BLD AUTO: 33.5 %
MAN DIFF?: NORMAL
MCHC RBC-ENTMCNC: 29.9 PG
MCHC RBC-ENTMCNC: 33.5 G/DL
MCV RBC AUTO: 89.3 FL
MONOCYTES # BLD AUTO: 0.56 K/UL
MONOCYTES NFR BLD AUTO: 10 %
NEUTROPHILS # BLD AUTO: 2.96 K/UL
NEUTROPHILS NFR BLD AUTO: 52.6 %
NONHDLC SERPL-MCNC: 54 MG/DL
PLATELET # BLD AUTO: 247 K/UL
POTASSIUM SERPL-SCNC: 4.3 MMOL/L
PROT SERPL-MCNC: 7.6 G/DL
RBC # BLD: 5.15 M/UL
RBC # FLD: 11.9 %
SODIUM SERPL-SCNC: 141 MMOL/L
TRIGL SERPL-MCNC: 81 MG/DL
WBC # FLD AUTO: 5.62 K/UL

## 2025-03-13 ENCOUNTER — TRANSCRIPTION ENCOUNTER (OUTPATIENT)
Age: 50
End: 2025-03-13

## 2025-08-16 ENCOUNTER — NON-APPOINTMENT (OUTPATIENT)
Age: 50
End: 2025-08-16

## 2025-08-17 LAB
25(OH)D3 SERPL-MCNC: 39.8 NG/ML
ALBUMIN SERPL ELPH-MCNC: 5.2 G/DL
ALP BLD-CCNC: 70 U/L
ALT SERPL-CCNC: 90 U/L
ANION GAP SERPL CALC-SCNC: 15 MMOL/L
APPEARANCE: CLEAR
AST SERPL-CCNC: 65 U/L
BASOPHILS # BLD AUTO: 0.06 K/UL
BASOPHILS NFR BLD AUTO: 0.8 %
BILIRUB SERPL-MCNC: 0.8 MG/DL
BILIRUBIN URINE: NEGATIVE
BLOOD URINE: NEGATIVE
BUN SERPL-MCNC: 12 MG/DL
CALCIUM SERPL-MCNC: 9.8 MG/DL
CHLORIDE SERPL-SCNC: 105 MMOL/L
CHOLEST SERPL-MCNC: 119 MG/DL
CO2 SERPL-SCNC: 24 MMOL/L
COLOR: YELLOW
CREAT SERPL-MCNC: 0.84 MG/DL
EGFRCR SERPLBLD CKD-EPI 2021: 108 ML/MIN/1.73M2
EOSINOPHIL # BLD AUTO: 0.19 K/UL
EOSINOPHIL NFR BLD AUTO: 2.7 %
ESTIMATED AVERAGE GLUCOSE: 120 MG/DL
GLUCOSE QUALITATIVE U: NEGATIVE MG/DL
GLUCOSE SERPL-MCNC: 101 MG/DL
HBA1C MFR BLD HPLC: 5.8 %
HCT VFR BLD CALC: 42.7 %
HDLC SERPL-MCNC: 63 MG/DL
HGB BLD-MCNC: 14.8 G/DL
IMM GRANULOCYTES NFR BLD AUTO: 0.3 %
KETONES URINE: NEGATIVE MG/DL
LDLC SERPL-MCNC: 43 MG/DL
LEUKOCYTE ESTERASE URINE: NEGATIVE
LYMPHOCYTES # BLD AUTO: 2.43 K/UL
LYMPHOCYTES NFR BLD AUTO: 34.1 %
MAN DIFF?: NORMAL
MCHC RBC-ENTMCNC: 30.8 PG
MCHC RBC-ENTMCNC: 34.7 G/DL
MCV RBC AUTO: 88.8 FL
MONOCYTES # BLD AUTO: 0.66 K/UL
MONOCYTES NFR BLD AUTO: 9.3 %
NEUTROPHILS # BLD AUTO: 3.76 K/UL
NEUTROPHILS NFR BLD AUTO: 52.8 %
NITRITE URINE: NEGATIVE
NONHDLC SERPL-MCNC: 56 MG/DL
PH URINE: 6
PLATELET # BLD AUTO: 214 K/UL
POTASSIUM SERPL-SCNC: 4.5 MMOL/L
PROT SERPL-MCNC: 7.5 G/DL
PROTEIN URINE: NEGATIVE MG/DL
PSA SERPL-MCNC: 1.38 NG/ML
RBC # BLD: 4.81 M/UL
RBC # FLD: 11.9 %
SODIUM SERPL-SCNC: 144 MMOL/L
SPECIFIC GRAVITY URINE: 1.02
T4 FREE SERPL-MCNC: 1 NG/DL
TRIGL SERPL-MCNC: 62 MG/DL
TSH SERPL-ACNC: 1.71 UIU/ML
UROBILINOGEN URINE: 0.2 MG/DL
VIT B12 SERPL-MCNC: 503 PG/ML
WBC # FLD AUTO: 7.12 K/UL

## 2025-08-18 ENCOUNTER — APPOINTMENT (OUTPATIENT)
Dept: INTERNAL MEDICINE | Facility: CLINIC | Age: 50
End: 2025-08-18
Payer: COMMERCIAL

## 2025-08-18 ENCOUNTER — NON-APPOINTMENT (OUTPATIENT)
Age: 50
End: 2025-08-18

## 2025-08-18 VITALS
HEIGHT: 68 IN | SYSTOLIC BLOOD PRESSURE: 130 MMHG | OXYGEN SATURATION: 98 % | WEIGHT: 171.56 LBS | RESPIRATION RATE: 15 BRPM | BODY MASS INDEX: 26 KG/M2 | HEART RATE: 84 BPM | DIASTOLIC BLOOD PRESSURE: 100 MMHG | TEMPERATURE: 98.1 F

## 2025-08-18 VITALS — SYSTOLIC BLOOD PRESSURE: 128 MMHG | DIASTOLIC BLOOD PRESSURE: 84 MMHG

## 2025-08-18 DIAGNOSIS — Z00.00 ENCOUNTER FOR GENERAL ADULT MEDICAL EXAMINATION W/OUT ABNORMAL FINDINGS: ICD-10-CM

## 2025-08-18 DIAGNOSIS — R73.03 PREDIABETES.: ICD-10-CM

## 2025-08-18 DIAGNOSIS — I10 ESSENTIAL (PRIMARY) HYPERTENSION: ICD-10-CM

## 2025-08-18 DIAGNOSIS — E78.00 PURE HYPERCHOLESTEROLEMIA, UNSPECIFIED: ICD-10-CM

## 2025-08-18 PROCEDURE — 93000 ELECTROCARDIOGRAM COMPLETE: CPT

## 2025-08-18 PROCEDURE — 99396 PREV VISIT EST AGE 40-64: CPT
